# Patient Record
Sex: FEMALE | Race: BLACK OR AFRICAN AMERICAN | Employment: UNEMPLOYED | URBAN - METROPOLITAN AREA
[De-identification: names, ages, dates, MRNs, and addresses within clinical notes are randomized per-mention and may not be internally consistent; named-entity substitution may affect disease eponyms.]

---

## 2017-06-18 ENCOUNTER — HOSPITAL ENCOUNTER (EMERGENCY)
Age: 62
Discharge: HOME | End: 2017-06-18
Attending: INTERNAL MEDICINE
Payer: MEDICAID

## 2017-06-18 VITALS
DIASTOLIC BLOOD PRESSURE: 69 MMHG | HEART RATE: 76 BPM | TEMPERATURE: 98 F | BODY MASS INDEX: 31.71 KG/M2 | HEIGHT: 67 IN | RESPIRATION RATE: 18 BRPM | SYSTOLIC BLOOD PRESSURE: 111 MMHG | WEIGHT: 202 LBS | OXYGEN SATURATION: 100 %

## 2017-06-18 DIAGNOSIS — G44.319 ACUTE POST-TRAUMATIC HEADACHE, NOT INTRACTABLE: ICD-10-CM

## 2017-06-18 DIAGNOSIS — S13.4XXA WHIPLASH INJURIES, INITIAL ENCOUNTER: ICD-10-CM

## 2017-06-18 DIAGNOSIS — S09.90XA HEAD TRAUMA, INITIAL ENCOUNTER: Primary | ICD-10-CM

## 2017-06-18 LAB
APPEARANCE UR: CLEAR
BILIRUB UR QL: NEGATIVE
COLOR UR: YELLOW
GLUCOSE UR STRIP.AUTO-MCNC: NEGATIVE MG/DL
HGB UR QL STRIP: NEGATIVE
KETONES UR QL STRIP.AUTO: NEGATIVE MG/DL
LEUKOCYTE ESTERASE UR QL STRIP.AUTO: NEGATIVE
NITRITE UR QL STRIP.AUTO: NEGATIVE
PH UR STRIP: 5.5 [PH] (ref 5–8)
PROT UR STRIP-MCNC: NEGATIVE MG/DL
SP GR UR REFRACTOMETRY: 1.02 (ref 1–1.03)
UROBILINOGEN UR QL STRIP.AUTO: 1 EU/DL (ref 0.2–1)

## 2017-06-18 PROCEDURE — 81003 URINALYSIS AUTO W/O SCOPE: CPT | Performed by: INTERNAL MEDICINE

## 2017-06-18 PROCEDURE — 99282 EMERGENCY DEPT VISIT SF MDM: CPT

## 2017-06-18 RX ORDER — LORATADINE 10 MG/1
10 TABLET ORAL DAILY
COMMUNITY

## 2017-06-18 RX ORDER — HYDROCODONE BITARTRATE AND ACETAMINOPHEN 5; 325 MG/1; MG/1
1 TABLET ORAL
Qty: 20 TAB | Refills: 0 | Status: SHIPPED | OUTPATIENT
Start: 2017-06-18

## 2017-06-18 RX ORDER — HALOPERIDOL 5 MG/ML
50 INJECTION INTRAMUSCULAR ONCE
COMMUNITY

## 2017-06-18 RX ORDER — VALACYCLOVIR HYDROCHLORIDE 1 G/1
1000 TABLET, FILM COATED ORAL
COMMUNITY

## 2017-06-18 RX ORDER — HYDROCHLOROTHIAZIDE 25 MG/1
25 TABLET ORAL DAILY
COMMUNITY

## 2017-06-18 RX ORDER — TRAZODONE HYDROCHLORIDE 100 MG/1
100 TABLET ORAL
COMMUNITY

## 2017-06-18 NOTE — DISCHARGE INSTRUCTIONS
Whiplash: Care Instructions  Your Care Instructions  Whiplash occurs when your head is suddenly forced forward and then snapped backward, as might happen in a car accident or sports injury. This can cause pain and stiffness in your neck. Your head, chest, shoulders, and arms also may hurt. Most whiplash gets better with home care. Your doctor may advise you to take medicine to relieve pain or relax your muscles. He or she may suggest exercise and physical therapy to increase flexibility and relieve pain. You can try wearing a neck (cervical) collar to support your neck. For a while you probably will need to avoid lifting and other activities that can strain the neck. Follow-up care is a key part of your treatment and safety. Be sure to make and go to all appointments, and call your doctor if you are having problems. It's also a good idea to know your test results and keep a list of the medicines you take. How can you care for yourself at home? · Take pain medicines exactly as directed. ¨ If the doctor gave you a prescription medicine for pain, take it as prescribed. ¨ If you are not taking a prescription pain medicine, ask your doctor if you can take an over-the-counter medicine. ¨ Do not take two or more pain medicines at the same time unless the doctor told you to. Many pain medicines have acetaminophen, which is Tylenol. Too much acetaminophen (Tylenol) can be harmful. · You can try using a soft foam collar to support your neck for short periods of time. You can buy one at most drugstores. Do not wear the collar more than 2 or 3 days unless your doctor tells you to. · You can try using heat and ice to see if it helps. ¨ Try using a heating pad on a low or medium setting for 15 to 20 minutes every 2 to 3 hours. Try a warm shower in place of one session with the heating pad. You can also buy single-use heat wraps that last up to 8 hours.   ¨ You can also try an ice pack for 10 to 15 minutes every 2 to 3 hours. · Do not do anything that makes the pain worse. Take it easy for a couple of days. You can do your usual activities if they do not hurt your neck or put it at risk for more stress or injury. Avoid lifting, sports, or other activities that might strain your neck. · Try sleeping on a special neck pillow. Place it under your neck, not under your head. Placing a tightly rolled-up towel under your neck while you sleep will also work. If you use a neck pillow or rolled towel, do not use your regular pillow at the same time. · Once your neck pain is gone, do exercises to stretch your neck and back and make them stronger. Your doctor or physical therapist can tell you which exercises are best.  When should you call for help? Call 911 anytime you think you may need emergency care. For example, call if:  · You are unable to move an arm or a leg at all. Call your doctor now or seek immediate medical care if:  · You have new or worse symptoms in your arms, legs, chest, belly, or buttocks. Symptoms may include:  ¨ Numbness or tingling. ¨ Weakness. ¨ Pain. · You lose bladder or bowel control. Watch closely for changes in your health, and be sure to contact your doctor if:  · You are not getting better as expected. Where can you learn more? Go to http://nannette-sharyn.info/. Enter B279 in the search box to learn more about \"Whiplash: Care Instructions. \"  Current as of: May 23, 2016  Content Version: 11.2  © 0822-9438 [employer]. Care instructions adapted under license by [employer] (which disclaims liability or warranty for this information). If you have questions about a medical condition or this instruction, always ask your healthcare professional. [place] [address] any warranty or liability for your use of this information. Headache: Care Instructions  Your Care Instructions    Headaches have many possible causes.  Most headaches aren't a sign of a more serious problem, and they will get better on their own. Home treatment may help you feel better faster. The doctor has checked you carefully, but problems can develop later. If you notice any problems or new symptoms, get medical treatment right away. Follow-up care is a key part of your treatment and safety. Be sure to make and go to all appointments, and call your doctor if you are having problems. It's also a good idea to know your test results and keep a list of the medicines you take. How can you care for yourself at home? · Do not drive if you have taken a prescription pain medicine. · Rest in a quiet, dark room until your headache is gone. Close your eyes and try to relax or go to sleep. Don't watch TV or read. · Put a cold, moist cloth or cold pack on the painful area for 10 to 20 minutes at a time. Put a thin cloth between the cold pack and your skin. · Use a warm, moist towel or a heating pad set on low to relax tight shoulder and neck muscles. · Have someone gently massage your neck and shoulders. · Take pain medicines exactly as directed. ¨ If the doctor gave you a prescription medicine for pain, take it as prescribed. ¨ If you are not taking a prescription pain medicine, ask your doctor if you can take an over-the-counter medicine. · Be careful not to take pain medicine more often than the instructions allow, because you may get worse or more frequent headaches when the medicine wears off. · Do not ignore new symptoms that occur with a headache, such as a fever, weakness or numbness, vision changes, or confusion. These may be signs of a more serious problem. To prevent headaches  · Keep a headache diary so you can figure out what triggers your headaches. Avoiding triggers may help you prevent headaches. Record when each headache began, how long it lasted, and what the pain was like (throbbing, aching, stabbing, or dull).  Write down any other symptoms you had with the headache, such as nausea, flashing lights or dark spots, or sensitivity to bright light or loud noise. Note if the headache occurred near your period. List anything that might have triggered the headache, such as certain foods (chocolate, cheese, wine) or odors, smoke, bright light, stress, or lack of sleep. · Find healthy ways to deal with stress. Headaches are most common during or right after stressful times. Take time to relax before and after you do something that has caused a headache in the past.  · Try to keep your muscles relaxed by keeping good posture. Check your jaw, face, neck, and shoulder muscles for tension, and try relaxing them. When sitting at a desk, change positions often, and stretch for 30 seconds each hour. · Get plenty of sleep and exercise. · Eat regularly and well. Long periods without food can trigger a headache. · Treat yourself to a massage. Some people find that regular massages are very helpful in relieving tension. · Limit caffeine by not drinking too much coffee, tea, or soda. But don't quit caffeine suddenly, because that can also give you headaches. · Reduce eyestrain from computers by blinking frequently and looking away from the computer screen every so often. Make sure you have proper eyewear and that your monitor is set up properly, about an arm's length away. · Seek help if you have depression or anxiety. Your headaches may be linked to these conditions. Treatment can both prevent headaches and help with symptoms of anxiety or depression. When should you call for help? Call 911 anytime you think you may need emergency care. For example, call if:  · You have signs of a stroke. These may include:  ¨ Sudden numbness, paralysis, or weakness in your face, arm, or leg, especially on only one side of your body. ¨ Sudden vision changes. ¨ Sudden trouble speaking. ¨ Sudden confusion or trouble understanding simple statements.   ¨ Sudden problems with walking or balance. ¨ A sudden, severe headache that is different from past headaches. Call your doctor now or seek immediate medical care if:  · You have a new or worse headache. · Your headache gets much worse. Where can you learn more? Go to http://nannette-sharyn.info/. Enter M271 in the search box to learn more about \"Headache: Care Instructions. \"  Current as of: October 14, 2016  Content Version: 11.2  © 3348-9075 [employer]. Care instructions adapted under license by [employer] (which disclaims liability or warranty for this information). If you have questions about a medical condition or this instruction, always ask your healthcare professional. [place] [address] any warranty or liability for your use of this information. Learning About a Closed Head Injury  What is a closed head injury? A closed head injury happens when your head gets hit hard. The strong force of the blow causes your brain to shake in your skull. This movement can cause the brain to bruise, swell, or tear. Sometimes nerves or blood vessels also get damaged. This can cause bleeding in or around the brain. A concussion is a type of closed head injury. What are the symptoms? If you have a mild concussion, you may have a mild headache or feel \"not quite right. \" These symptoms are common. They usually go away over a few days to 4 weeks. But sometimes after a concussion, you feel like you can't function as well as before the injury. And you have new symptoms. This is called postconcussive syndrome. You may:  · Find it harder to solve problems, think, concentrate, or remember. · Have headaches. · Have changes in your sleep patterns, such as not being able to sleep or sleeping all the time. · Have changes in your personality. · Not be interested in your usual activities. · Feel angry or anxious without a clear reason. · Lose your sense of taste or smell.   · Be dizzy, lightheaded, or unsteady. It may be hard to stand or walk. How is a closed head injury treated? Any person who may have a concussion needs to see a doctor. Some people have to stay in the hospital to be watched. Others can go home safely. If you go home, follow your doctor's instructions. He or she will tell you if you need someone to watch you closely for the next 24 hours or longer. Rest is the best treatment. Get plenty of sleep at night. And try to rest during the day. · Avoid activities that are physically or mentally demanding. These include housework, exercise, and schoolwork. And don't play video games, send text messages, or use the computer. You may need to change your school or work schedule to be able to avoid these activities. · Ask your doctor when it's okay to drive, ride a bike, or operate machinery. · Take an over-the-counter pain medicine, such as acetaminophen (Tylenol), ibuprofen (Advil, Motrin), or naproxen (Aleve). Be safe with medicines. Read and follow all instructions on the label. · Check with your doctor before you use any other medicines for pain. · Do not drink alcohol or use illegal drugs. They can slow recovery. They can also increase your risk of getting a second head injury. Follow-up care is a key part of your treatment and safety. Be sure to make and go to all appointments, and call your doctor if you are having problems. It's also a good idea to know your test results and keep a list of the medicines you take. Where can you learn more? Go to http://nannette-sharyn.info/. Enter E235 in the search box to learn more about \"Learning About a Closed Head Injury. \"  Current as of: October 14, 2016  Content Version: 11.2  © 3196-5301 [employer]. Care instructions adapted under license by [employer] (which disclaims liability or warranty for this information).  If you have questions about a medical condition or this instruction, always ask your healthcare professional. [place] [address] any warranty or liability for your use of this information.
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
## 2017-06-18 NOTE — ED TRIAGE NOTES
Amb into ed w/ reports h/a onset yesterday -worse over nite - no pmh same. Pt describes h/a as heavy and achy in nature. Does not feel right per pt. Denies any weakness. Good appetite.

## 2017-11-15 ENCOUNTER — HOSPITAL ENCOUNTER (OUTPATIENT)
Dept: PHYSICAL THERAPY | Age: 62
Discharge: HOME | End: 2017-11-15
Payer: MEDICAID

## 2017-11-15 PROCEDURE — 97110 THERAPEUTIC EXERCISES: CPT

## 2017-11-15 PROCEDURE — 97530 THERAPEUTIC ACTIVITIES: CPT

## 2017-11-15 PROCEDURE — 97162 PT EVAL MOD COMPLEX 30 MIN: CPT

## 2017-11-15 NOTE — PROGRESS NOTES
[facility] in [address] [place], [facility] [address]  Phone: [phone]      Fax:  [phone] of Care/ Statement of Necessity for Physical Therapy Services       Patient name: Radha Velasquez Start of Care: 11/15/2017   Referral source: Nettie Orantes MD : 1955    Medical Diagnosis: Acute pain of right knee [M25.561]  Right ankle pain [M25.571]   Onset Date:10/31/17 right knee, > 5 years right ankle    Treatment Diagnosis: right knee and ankle pain   Prior Hospitalization: see medical history Provider#: [id number]   Medications: Verified on Patient summary List    Comorbidities: arthritis, high BP, chronic right ankle pain after sprain in    Prior Level of Function: chronic right ankle pain      The Plan of Care and following information is based on the information from the initial evaluation. Assessment/ key information: 57 YOF s/p recent onset of right knee pain and 5 year history of fluctuating right ankle pain with recent exacerbation. Patient reports pain levels ranging from 0-9/10 nicole with prolonged ambulation. Objective findings include limited knee ROM (), limited right ankle DF (5 deg), antalgic gait pattern, bilateral ankle varus, deficit in proximal LE strength and overall limited function. Patient is a good candidate for skilled PT. Evaluation Complexity History HIGH Complexity :3+ comorbidities / personal factors will impact the outcome/ POC ; Examination MEDIUM Complexity : 3 Standardized tests and measures addressing body structure, function, activity limitation and / or participation in recreation  ;Presentation MEDIUM Complexity : Evolving with changing characteristics  ; Clinical Decision Making MEDIUM Complexity : FOTO score of 26-74  Overall Complexity Rating: MEDIUM  Problem List: pain affecting function, decrease ROM, decrease strength, edema affecting function, impaired gait/ balance, decrease ADL/ functional abilitiies, decrease activity tolerance, decrease flexibility/ joint mobility and decrease transfer abilities   Treatment Plan may include any combination of the following: Therapeutic exercise, Therapeutic activities, Neuromuscular re-education, Physical agent/modality, Manual therapy and Patient education  Patient / Family readiness to learn indicated by: asking questions and trying to perform skills  Persons(s) to be included in education: patient (P)  Barriers to Learning/Limitations: None  Patient Goal (s): I want to be able to walk without a limp  Patient Self Reported Health Status: good  Rehabilitation Potential: good    Short Term Goals: To be accomplished in 3 weeks:   1. Improved knee ROM to >or= 5-130 deg for return to PLOF  Status at [facility]: Right knee Flex 120, Ext -20    2. Improved right ankle DF to >or= 20 deg to allow return to normal gait pattern on flat surface and stairs  Status at [facility]: DF right 5 deg with pain at end range, limited ambulation tolerance , difficulty with ambulation on stairs      Long Term Goals: To be accomplished in 6 weeks:   1. Improved FOTO score to >or= 51/100 as evidence of improved function and return to community ambulation  Status at [facility]: FOTO 33/100    2. Ability to perform sit to stand transfers without use of hands as evidence of improved LE strength  Status at [facility]: required to use hands for transfers    3. Ability to return to community ambulation with minimal or no limp  Status at [facility]: antalgic gait pattern    4. Reduction of pain to <or= 3/10 with ADL to allow retutn to PLOF  STatus at [facility]: pain ranging from 0-9/10 nicole with ambulation    Frequency / Duration: Patient to be seen 2 times per week for 6 weeks.     Patient/ Caregiver education and instruction: Diagnosis, prognosis, exercises   [x]  Plan of care has been reviewed with KIMBERLEE Loco, PT 11/15/2017 9:13 AM  _____________________________________________________________________  I certify that the above Therapy Services are being furnished while the patient is under my care. I agree with the treatment plan and certify that this therapy is necessary. Physician's Signature:____________________  Date:__________Time:______    Please sign and return to   [facility] in [address] [place] [address] [place]  Phone: [phone]      Fax:  [phone]

## 2017-11-15 NOTE — PROGRESS NOTES
PT DAILY TREATMENT NOTE/KNEE EVAL 3-16    Patient Name: Flores Sears  Date:11/15/2017  : 1955  [x]  Patient  Verified  Payor: Michel Grace / [place] / Product Type: Managed Care Medicaid /    In time:910  Out time:1010  Total Treatment Time (min): 60  Total Timed Codes (min): 60  1:1 Treatment Time ( only): n/a   Visit #: 1 of 12    Treatment Area: Acute pain of right knee [M25.561]  Right ankle pain [M25.571]    SUBJECTIVE  Pain Level (0-10 scale): right knee 5-6, right ankle 4  []constant [x]intermittent []improving []worsening []no change since onset    Any medication changes, allergies to medications, adverse drug reactions, diagnosis change, or new procedure performed?: [x] No    [] Yes (see summary sheet for update)  Subjective functional status/changes: Reports  ankle pain for over 5 years. Reports twisting her right ankle when running wearing high heels . Unsure if she sustained any tears. Since then off/on pain with activity and at rest. Nocturnal pain. Onset of right knee pain  due to gout as per MD. No injury. Occasional pain free episodes. Stiffness in AM. Pain 0-9/10 nicole with ambulation. Not working outside of home. 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 Difficulty with prolonged ambulation,  Stair climbing, sit to stand transfer (I need to push myself up).   PLOF: full function with fluctuating levels of ankle pain  Limitations to PLOF: prolonged standing/ambulation  Mechanism of Injury: as above  Current symptoms/Complaints: stiffness /pain right knee, right ankle pain  Previous Treatment/Compliance: PT for right ankle at our clinic   PMHx/Surgical Hx: n/a  Work Hx: homemaker  Living Situation: apartment, 4th floor with elevator  Pt Goals: I want to be able to walk without a limp  Barriers: [x]pain []financial []time []transportation []other  Motivation: good  Substance use: []Alcohol []Tobacco []other:   FABQ Score: []low []elevate  Cognition: A & O x 3    Other:    OBJECTIVE/EXAMINATION  Domestic Life: WNL  Activity/Recreational Limitations: fishing, not lately due to weather and knee pain  Mobility: WNL  Self Care:  WNL        Modality rationale: Pain control   Min Type Additional Details    [] Estim:  []Unatt       []IFC  []Premod                        []Other:  []w/ice   []w/heat  Position:  Location:    [] Estim: []Att    []TENS instruct  []NMES                    []Other:  []w/US   []w/ice   []w/heat  Position:  Location:    []  Traction: [] Cervical       []Lumbar                       [] Prone          []Supine                       []Intermittent   []Continuous Lbs:  [] before manual  [] after manual    []  Ultrasound: []Continuous   [] Pulsed                           []1MHz   []3MHz Location:  W/cm2:    []  Iontophoresis with dexamethasone         Location: [] Take home patch   [] In clinic    []  Ice     []  heat  []  Ice massage  []  Laser   []  Anodyne Position:  Location:    []  Laser with stim  []  Other: Position:  Location:    []  Vasopneumatic Device Pressure:       [] lo [] med [] hi   Temperature: [] lo [] med [] hi   [] Skin assessment post-treatment:  []intact []redness- no adverse reaction    []redness  adverse reaction:     20 min []Eval                  []Re-Eval       25 min Therapeutic Exercise:  [x] See flow sheet :ROM/strength   Rationale: increase ROM and increase strength to improve the patients ability to return to full function    15 min Therapeutic Activity:  [x]  See flow sheet :instruction in HEP and POC   Rationale: increase ROM and increase strength  to improve the patients ability to return to PLOF               With   [] TE   [] TA   [] neuro   [] other: Patient Education: [x] Review HEP    [] Progressed/Changed HEP based on:   [] positioning   [] body mechanics   [] transfers   [] heat/ice application    [] other:      Other Objective/Functional Measures: as per evaluation    Physical Therapy Evaluation - Knee    Posture: [] Varus    [] Valgus    [] Recurvatum        [] Tibial Torsion    [] Foot Supination    [] Foot Pronation    Describe:mild guarding in Flex right knee, normal alignment  Bilateral ankle varus right >left, increased hip ER in standing on right    Gait:  [] Normal    [] Abnormal    [x] Antalgic    [] NWB    Device:none, occasional use of SC    Describe:    ROM / Strength  [] Unable to assess                  AROM                      PROM                   Strength (1-5)    Left Right Left Right Left Right   Hip Flexion WNL WNL   4- 4-    Extension     3+ 3+    Abduction     4 4-    Adduction     3 3   Knee Flexion 130 120p  p  4+    Extension 0 -20p  p  4   Ankle Plantarflexion  60p  p  4    Dorsiflexion  5p  p  4   Ankle Eversion/Inversion limited with reported pain  Marked hypomobility right calcaneus with manual mobs    Flexibility: [] Unable to assess at this time  Hamstrings:    (L) Tightness= [] WNL   [] Min   [x] Mod   [] Severe    (R) Tightness= [] WNL   [] Min   [x] Mod   [] Severe  Quadriceps:    (L) Tightness= [] WNL   [x] Min   [] Mod   [] Severe    (R) Tightness= [] WNL   [x] Min   [] Mod   [] Severe  Gastroc:      (L) Tightness= [] WNL   [x] Min   [] Mod   [] Severe    (R) Tightness= [] WNL   [] Min   [x] Mod   [] Severe  Other:    Palpation:   Neg/Pos  Neg/Pos  Neg/Pos   Joint Line pos Quad tendon pos Patellar ligament pos   Patella pos Fibular head  Pes Anserinus    Tibial tubercle  Hamstring tendons pos Infrapatellar fat pad      Optional Tests:  Patellar Positioning (Static)   []L []R Normal []L []R Lateral   []L []R Orrie Primas      []L []R Medial   []L []R [place]    Patellar Tracking   []L []R Glide (Lat)   []L []R Tilt (Lat)     []L []R Glide (Med)  []L []R Tilt (Med)      []L []R Tile (Inf)     Patellar Mobility   []L []R Hypermobile []L []R Hypomobile         Girth Measurements:     Cm at  Cm above joint line   Cm at   Cm below joint line  Cm at joint line   Left 44.5    44   Right  42    41     Lachmans  [] Neg    [] Pos Posterior Drawer [] Neg    [] Pos  Pivot Shift  [] Neg    [] Pos Posterior Sag  [] Neg    [] Pos  ABDULLAHI   [] Neg    [] Pos Houston's Test [] Neg    [] Pos  ALRI   [] Neg    [] Pos Squat   [] Neg    [x] Pos, limited ROM and pain  Valgus@ 0 Degrees [x] Neg    [] Pos Elvira-Sudhir [] Neg    [] Pos  Valgus@ 30 Degrees [x] Neg    [] Pos Patellar Apprehension [] Neg    [] Pos  Varus@ 0 Degrees [x] Neg    [] Pos Ba's Compression [] Neg    [] Pos  Varus@ 30 Degrees [x] Neg    [] Pos Ely's Test  [] Neg    [] Pos  Apley's Compression [] Neg    [] Pos Cornelia's Test  [] Neg    [] Pos  Apley's Distraction [] Neg    [] Pos Stroke Test  [] Neg    [] Pos   Anterior Drawer [] Neg    [] Pos Fluctuation Test [] Neg    [] Pos  Other:                  [] Neg    [] Pos                 Other tests/comments:pain with ROM        Pain Level (0-10 scale) post treatment: 6/10 knee, 4/10 ankle    ASSESSMENT/Changes in Function: reduction in antalgic gait pattern    Patient will continue to benefit from skilled PT services to address ROM deficits, address strength deficits, analyze and address soft tissue restrictions and analyze and cue movement patterns to attain remaining goals.      [x]  See Plan of Care  []  See progress note/recertification  []  See Discharge Summary         Progress towards goals / Updated goals:  Understanding of basic HEP    PLAN  []  Upgrade activities as tolerated     [x]  Continue plan of care  []  Update interventions per flow sheet       []  Discharge due to:_  []  Other:_      Yari Howard, PT 11/15/2017  9:14 AM

## 2017-11-21 ENCOUNTER — APPOINTMENT (OUTPATIENT)
Dept: PHYSICAL THERAPY | Age: 62
End: 2017-11-21
Payer: MEDICAID

## 2017-11-28 ENCOUNTER — APPOINTMENT (OUTPATIENT)
Dept: PHYSICAL THERAPY | Age: 62
End: 2017-11-28
Payer: MEDICAID

## 2017-11-30 ENCOUNTER — APPOINTMENT (OUTPATIENT)
Dept: PHYSICAL THERAPY | Age: 62
End: 2017-11-30
Payer: MEDICAID

## 2017-12-27 ENCOUNTER — APPOINTMENT (OUTPATIENT)
Dept: PHYSICAL THERAPY | Age: 62
End: 2017-12-27

## 2018-01-04 ENCOUNTER — APPOINTMENT (OUTPATIENT)
Dept: PHYSICAL THERAPY | Age: 63
End: 2018-01-04

## 2018-01-12 ENCOUNTER — HOSPITAL ENCOUNTER (OUTPATIENT)
Dept: PHYSICAL THERAPY | Age: 63
Discharge: HOME | End: 2018-01-12
Payer: MEDICAID

## 2018-01-12 PROCEDURE — 97530 THERAPEUTIC ACTIVITIES: CPT

## 2018-01-12 PROCEDURE — 97110 THERAPEUTIC EXERCISES: CPT

## 2018-01-16 ENCOUNTER — HOSPITAL ENCOUNTER (OUTPATIENT)
Dept: PHYSICAL THERAPY | Age: 63
Discharge: HOME | End: 2018-01-16
Payer: MEDICAID

## 2018-01-16 PROCEDURE — 97110 THERAPEUTIC EXERCISES: CPT

## 2018-01-16 PROCEDURE — 97140 MANUAL THERAPY 1/> REGIONS: CPT

## 2018-01-16 NOTE — PROGRESS NOTES
[facility] in [address] [place], [address] [place] [address]  Phone: [phone]      Fax:  [phone]    Physical Therapy Progress Note  Patient name: Lulu Euceda Start of Care: 11/15/17   Referral source: Yady Garcia MD : 1955   Medical/Treatment Diagnosis: Acute pain of right knee [M25.561]  Right ankle pain [M25.571] Onset Date:10/31/17 right knee, > 5 years right ankle     Prior Hospitalization: see medical history Provider#: [id number]   Medications: Verified on Patient Summary List    Comorbidities: arthritis, high BP, chronic right ankle pain after sprain in   Prior Level of Function:chronic right ankle pain    Visits from Start of Care: 3    Missed Visits: [facility] be accomplished in 3 weeks:                           1. Improved knee ROM to >or= 5-130 deg for return to PLOF  Status at Eval: Right knee Flex 120, Ext -20  Current status: Right knee Flex 115, Ext -5, progressing for Ext      2. Improved right ankle DF to >or= 20 deg to allow return to normal gait pattern on flat surface and stairs  Status at Eval: DF right 5 deg with pain at end range, limited ambulation tolerance , difficulty with ambulation on stairs  Current: DF 6* still progressing           Long Term Goals: To be accomplished in 6 weeks:                           1. Improved FOTO score to >or= 51/100 as evidence of improved function and return to community ambulation  Status at Eval: FOTO 33/100  Current: Will assess NV      2. Ability to perform sit to stand transfers without use of hands as evidence of improved LE strength  Status at Eval: required to use hands for transfers  Current: Continues to require both UE progressing       3. Ability to return to community ambulation with minimal or no limp  Status at Eval: antalgic gait pattern  Current: Continued antalgic gait, however decreased with use of cane progressing       4.  Reduction of pain to <or= 3/10 with ADL to allow retutn to PLOF  STatus at Eval: pain ranging from 0-9/10 nicole with ambulation   Current: pain ranging 8/10 with walking increasing pain progressing     Key Functional Changes: Pt presented to therapy with c/c right knee with onset in October and 5 year history of fluctuating right ankle pain. Pt has only attended 3 sessions due to gout flare up. Pt reports continued pain up to 8/10 at times with ambulation and up/down stairs still difficult for patient due to pain. Pt would benefit from continued PT services to address decreased strength, quad/HS flexibility, and ROM to allow pt to return to prior level of function.       Updated Goals: to be achieved in 4 weeks:   See above    ASSESSMENT/RECOMMENDATIONS:  [x]Continue therapy per initial plan/protocol at a frequency of  1-2 x per week for 4 weeks  []Continue therapy with the following recommended changes:_____________________      _____________________________________________________________________  []Discontinue therapy progressing towards or have reached established goals  []Discontinue therapy due to lack of appreciable progress towards goals  []Discontinue therapy due to lack of attendance or compliance  []Await Physician's recommendations/decisions regarding therapy  []Other:________________________________________________________________    Thank you for this referral.   Merry Coronado [facility] 1/16/2018 2:13 PM    NOTE TO PHYSICIAN:  PLEASE COMPLETE THE ORDERS BELOW AND   FAX TO [facility]: [phone]  If you are unable to process this request in 24 hours please contact our office: [phone]      ____ I have read the above report and request that my patient continue therapy with the following changes/special instructions:  ____ I have read the above report and request that my patient be discharged from therapy    Physician's Signature:_____________________ Date:___________Time:__________

## 2018-01-16 NOTE — PROGRESS NOTES
PT DAILY TREATMENT NOTE     Patient Name: Rosa Camilo  Date:2018  : 1955  [x]  Patient  Verified  Payor: [facility] MEDICAID / [place]: [place] [employer] [employer] / Product Type: Managed Care Medicaid /    In time:1:40  Out time:2:30  Total Treatment Time (min): 50  Visit #: 3 of 12    Treatment Area: Acute pain of right knee [M25.561]  Right ankle pain [M25.571]    SUBJECTIVE  Pain Level (0-10 scale): 6  Any medication changes, allergies to medications, adverse drug reactions, diagnosis change, or new procedure performed?: [x] No    [] Yes (see summary sheet for update)  Subjective functional status/changes:   [] No changes reported  \"My leg still really hurts when I walk and go up/down stairs. \"    OBJECTIVE    Modality rationale: decrease inflammation and decrease pain to improve the patients ability to perform daily activities with decreased pain and symptom levels   Min Type Additional Details    [] Estim:  []Unatt       []IFC  []Premod                        []Other:  []w/ice   []w/heat  Position:  Location:    [] Estim: []Att    []TENS instruct  []NMES                    []Other:  []w/US   []w/ice   []w/heat  Position:  Location:    []  Traction: [] Cervical       []Lumbar                       [] Prone          []Supine                       []Intermittent   []Continuous Lbs:  [] before manual  [] after manual    []  Ultrasound: []Continuous   [] Pulsed                           []1MHz   []3MHz W/cm2:  Location:    []  Iontophoresis with dexamethasone         Location: [] Take home patch   [] In clinic   10 [x]  Ice     []  heat  []  Ice massage  []  Laser   []  Anodyne Position: supine  Location: right knee    []  Laser with stim  []  Other:  Position:  Location:    []  Vasopneumatic Device Pressure:       [] lo [] med [] hi   Temperature: [] lo [] med [] hi   [x] Skin assessment post-treatment:  [x]intact []redness- no adverse reaction    []redness  adverse reaction:       32 min Therapeutic Exercise:  [x] See flow sheet :   Rationale: increase ROM, increase strength, improve coordination and increase proprioception to improve the patients ability to perform daily activities with decreased pain and symptom levels    8 min Manual Therapy:  STM to lateral and medial gastroc, gentle tibiofemoral mobs into extension, STM to distal quad   Rationale: decrease pain, increase ROM and increase tissue extensibility to perform daily activities with decreased pain and symptom levels          With   [] TE   [] TA   [] neuro   [] other: Patient Education: [x] Review HEP    [] Progressed/Changed HEP based on:   [] positioning   [] body mechanics   [] transfers   [] heat/ice application    [] other:      Other Objective/Functional Measures:   See updated goals below     Pain Level (0-10 scale) post treatment: 6    ASSESSMENT/Changes in Function: Pt presented to therapy with c/c right knee with onset in October and 5 year history of fluctuating right ankle pain. Pt has only attended 3 sessions due to gout flare up. Pt reports continued pain up to 8/10 at times with ambulation and up/down stairs still difficult for patient due to pain. Pt would benefit from continued PT services to address decreased strength, quad/HS flexibility, and ROM to allow pt to return to prior level of function. Patient will continue to benefit from skilled PT services to modify and progress therapeutic interventions, address functional mobility deficits, address ROM deficits, address strength deficits, analyze and address soft tissue restrictions, analyze and modify body mechanics/ergonomics and instruct in home and community integration to attain remaining goals. []  See Plan of Care  []  See progress note/recertification  []  See Discharge Summary         Progress towards goals / Updated goals:  Short Term Goals: To be accomplished in 3 weeks:                           1.  Improved knee ROM to >or= 5-130 deg for return to PLOF  Status at Eval: Right knee Flex 120, Ext -20  Current status: Right knee Flex 115, Ext -5, progressing for Ext      2. Improved right ankle DF to >or= 20 deg to allow return to normal gait pattern on flat surface and stairs  Status at Eval: DF right 5 deg with pain at end range, limited ambulation tolerance , difficulty with ambulation on stairs  Current: DF 6* still progressing           Long Term Goals: To be accomplished in 6 weeks:                           1. Improved FOTO score to >or= 51/100 as evidence of improved function and return to community ambulation  Status at Eval: FOTO 33/100  Current: Will assess NV      2. Ability to perform sit to stand transfers without use of hands as evidence of improved LE strength  Status at Eval: required to use hands for transfers  Current: Continues to require both UE progressing       3. Ability to return to community ambulation with minimal or no limp  Status at Eval: antalgic gait pattern  Current: Continued antalgic gait, however decreased with use of cane progressing       4.  Reduction of pain to <or= 3/10 with ADL to allow retutn to PLOF  STatus at Eval: pain ranging from 0-9/10 nicole with ambulation   Current: pain ranging 8/10 with walking increasing pain progressing     PLAN  [x]  Upgrade activities as tolerated     [x]  Continue plan of care  []  Update interventions per flow sheet       []  Discharge due to:_  []  Other:_      Valerie Donato 1/16/2018  1:49 PM    Future Appointments  Date Time Provider Wilder Padilla   1/18/2018 8:00 AM Valerie Dnoato [facility]   1/23/2018 11:00 AM Nadine Ceballos PT [facility]

## 2018-01-18 ENCOUNTER — APPOINTMENT (OUTPATIENT)
Dept: PHYSICAL THERAPY | Age: 63
End: 2018-01-18
Payer: MEDICAID

## 2018-01-19 ENCOUNTER — HOSPITAL ENCOUNTER (OUTPATIENT)
Dept: PHYSICAL THERAPY | Age: 63
Discharge: HOME | End: 2018-01-19
Payer: MEDICAID

## 2018-01-19 PROCEDURE — 97110 THERAPEUTIC EXERCISES: CPT

## 2018-01-19 PROCEDURE — 97140 MANUAL THERAPY 1/> REGIONS: CPT

## 2018-01-19 NOTE — PROGRESS NOTES
PT DAILY TREATMENT NOTE - [facility]     Patient Name: Christa Chong  Date:2018  : 1955  [x]  Patient  Verified  Payor: [facility] MEDICAID / [place]: [place] / Product Type: Managed Care Medicaid /    In time:11:30  Out time:12:19  Total Treatment Time (min): 49  Visit #: 4 of 12    Treatment Area: Acute pain of right knee [M25.561]  Right ankle pain [M25.571]    SUBJECTIVE  Pain Level (0-10 scale): 5  Any medication changes, allergies to medications, adverse drug reactions, diagnosis change, or new procedure performed?: [x] No    [] Yes (see summary sheet for update)  Subjective functional status/changes:   [] No changes reported  \"when you worked on my leg last time it really helped. \"    OBJECTIVE      37 min Therapeutic Exercise:  [x] See flow sheet :   Rationale: increase ROM, increase strength and improve coordination to improve the patients ability to perform daily activities with decreased pain and symptom levels    12 min Manual Therapy:  STM to lateral and medial gastroc, gentle tibiofemoral mobs into extension, STM to distal quad   Rationale: decrease pain, increase ROM and increase tissue extensibility to perform daily activities with decreased pain and symptom levels              With   [] TE   [] TA   [] neuro   [] other: Patient Education: [x] Review HEP    [] Progressed/Changed HEP based on:   [] positioning   [] body mechanics   [] transfers   [] heat/ice application    [] other:      Other Objective/Functional Measures:   Increased tone in distal quad, gastroc  Able to maintain SL balance for 20 seconds however increased lateral lean noted     Pain Level (0-10 scale) post treatment: 5    ASSESSMENT/Changes in Function: Pt continues to report pain in anterior right knee and ankle. Decreased sup/inf patella mobility noted on right possibly due to increased tone in quads. Continued antalgic gait due to pain without AD.  Fatigue with standing 3 way hip and clams due to glut weakness. Patient will continue to benefit from skilled PT services to modify and progress therapeutic interventions, address functional mobility deficits, address ROM deficits, address strength deficits, analyze and address soft tissue restrictions, analyze and modify body mechanics/ergonomics and instruct in home and community integration to attain remaining goals. []  See Plan of Care  []  See progress note/recertification  []  See Discharge Summary         Progress towards goals / Updated goals:  Short Term Goals: To be accomplished in 3 weeks:                           1. Improved knee ROM to >or= 5-130 deg for return to PLOF  Status at Eval: Right knee Flex 120, Ext -20  Last PN: Right knee Flex 115, Ext -5  Current:       2. Improved right ankle DF to >or= 20 deg to allow return to normal gait pattern on flat surface and stairs  Status at Eval: DF right 5 deg with pain at end range, limited ambulation tolerance , difficulty with ambulation on stairs  Last PN: DF 6* still   Current:           Long Term Goals: To be accomplished in 6 weeks:                           1. Improved FOTO score to >or= 51/100 as evidence of improved function and return to community ambulation  Status at Eval: FOTO 33/100  Current: Will assess at visit 5      2. Ability to perform sit to stand transfers without use of hands as evidence of improved LE strength  Status at Eval: required to use hands for transfers  Last PN: Continues to require both UE  Current:        3. Ability to return to community ambulation with minimal or no limp  Status at Eval: antalgic gait pattern  Last PN: Continued antalgic gait, however decreased with use of cane   Current: Still using SPC in community, antalgic on right LE without AD      4.  Reduction of pain to <or= 3/10 with ADL to allow retutn to PLOF  STatus at Eval: pain ranging from 0-9/10 nicole with ambulation   Last PN: pain ranging 8/10 with walking increasing pain   Current:     PLAN  [x] Upgrade activities as tolerated     [x]  Continue plan of care  []  Update interventions per flow sheet       []  Discharge due to:_  []  Other:_      Abbie Donato 1/19/2018  11:37 AM    Future Appointments  Date Time Provider Wilder Padilla   1/23/2018 11:00 AM Nadine sarah, PT [facility]

## 2018-01-23 ENCOUNTER — HOSPITAL ENCOUNTER (OUTPATIENT)
Dept: PHYSICAL THERAPY | Age: 63
Discharge: HOME | End: 2018-01-23
Payer: MEDICAID

## 2018-01-23 PROCEDURE — 97140 MANUAL THERAPY 1/> REGIONS: CPT

## 2018-01-23 PROCEDURE — 97110 THERAPEUTIC EXERCISES: CPT

## 2018-01-23 NOTE — PROGRESS NOTES
PT DAILY TREATMENT NOTE - [facility]     Patient Name: Jae Trammell  Date:2018  : 1955  [x]  Patient  Verified  Payor: [facility] MEDICAID / Plan: [place] [employer] [facility] / Product Type: Managed Care Medicaid /    In time:11  Out time:12:19  Total Treatment Time (min): 49  Visit #: 5 of 12    Treatment Area: Acute pain of right knee [M25.561]  Right ankle pain [M25.571]    SUBJECTIVE  Pain Level (0-10 scale): 4 knee, 5 ankle  Any medication changes, allergies to medications, adverse drug reactions, diagnosis change, or new procedure performed?: [x] No    [] Yes (see summary sheet for update)  Subjective functional status/changes:   [] No changes reported  \"I am feeling better but my knee still hurts when I straighten it out. \"    OBJECTIVE      30 min Therapeutic Exercise:  [x] See flow sheet :   Rationale: increase ROM, increase strength and improve coordination to improve the patients ability to perform daily activities with decreased pain and symptom levels    15 min Manual Therapy:  Functional massage to right quad in seated position, functional massage to achilles tendon in prone with knee flexed and extended, application of Kinesiotape over achilles tendon with 50% tension over calcaneal insertion for pain control   Rationale: decrease pain, increase ROM and increase tissue extensibility to perform daily activities with decreased pain and symptom levels              With   [] TE   [] TA   [] neuro   [] other: Patient Education: [x] Review HEP    [] Progressed/Changed HEP based on:   [] positioning   [] body mechanics   [] transfers   [] heat/ice application    [] other:      Other Objective/Functional Measures:   Not using SC unless having increased pain, limited community ambulation  Deficit in functional right TKE during ambulation  Right knee ROM , posterior knee tightness  Right ankle DF 15  Occasional sharp pain right posterior heel  TTP consistent with achilles tendonitis    Pain Level (0-10 scale) post treatment: 5    ASSESSMENT/Changes in Function: Pt continues to report pain in anterior right knee and ankle TTP to quad tendon and achilles insertion    Patient will continue to benefit from skilled PT services to modify and progress therapeutic interventions, address functional mobility deficits, address ROM deficits, address strength deficits, analyze and address soft tissue restrictions, analyze and modify body mechanics/ergonomics and instruct in home and community integration to attain remaining goals. [x]  See Plan of Care  []  See progress note/recertification  []  See Discharge Summary         Progress towards goals / Updated goals:  Short Term Goals: To be accomplished in 3 weeks:                           1. Improved knee ROM to >or= 5-130 deg for return to PLOF  Status at Eval: Right knee Flex 120, Ext -20  Last PN: Right knee Flex 115, Ext -5  Current: Active , progressing      2. Improved right ankle DF to >or= 20 deg to allow return to normal gait pattern on flat surface and stairs  Status at Eval: DF right 5 deg with pain at end range, limited ambulation tolerance , difficulty with ambulation on stairs  Last PN: DF 6* still   Current: Right ankle DF 15 deg (NWB), progressing          Long Term Goals: To be accomplished in 6 weeks:                           1. Improved FOTO score to >or= 51/100 as evidence of improved function and return to community ambulation  Status at Eval: FOTO 33/100  Current: FOTO knee 36/100, ankle 37/100, minimal improvement, progressing      2. Ability to perform sit to stand transfers without use of hands as evidence of improved LE strength  Status at Eval: required to use hands for transfers  Last PN: Continues to require both UE  Current:  NT      3.  Ability to return to community ambulation with minimal or no limp  Status at Eval: antalgic gait pattern  Last PN: Continued antalgic gait, however decreased with use of cane   Current: ambulation without SC, residual knee guarding and incomplete TKE during ambulation, progressing      4. Reduction of pain to <or= 3/10 with ADL to allow retutn to PLOF  STatus at Eval: pain ranging from 0-9/10 nicole with ambulation   Last PN: pain ranging 8/10 with walking increasing pain   Current: 4/10 right knee, 5/10 right ankle/heel, progressing    PLAN  [x]  Upgrade activities as tolerated     [x]  Continue plan of care  []  Update interventions per flow sheet       []  Discharge due to:_  []  Other:_      Nadine Cabrera, PT 1/23/2018  11:37 AM    No future appointments.

## 2018-01-30 ENCOUNTER — HOSPITAL ENCOUNTER (OUTPATIENT)
Dept: PHYSICAL THERAPY | Age: 63
End: 2018-01-30
Payer: MEDICAID

## 2018-02-02 ENCOUNTER — HOSPITAL ENCOUNTER (OUTPATIENT)
Dept: PHYSICAL THERAPY | Age: 63
Discharge: HOME | End: 2018-02-02
Payer: MEDICAID

## 2018-02-02 PROCEDURE — 97140 MANUAL THERAPY 1/> REGIONS: CPT

## 2018-02-02 PROCEDURE — 97110 THERAPEUTIC EXERCISES: CPT

## 2018-02-02 NOTE — PROGRESS NOTES
PT DAILY TREATMENT NOTE - [facility]     Patient Name: Yaakov Herrera  Date:2018  : 1955  [x]  Patient  Verified  Payor: [facility] MEDICAID / Plan: [place] [employer] [facility] / Product Type: Managed Care Medicaid /    In time:910  Out time:10  Total Treatment Time (min): 50  Visit #: 5 of 12    Treatment Area: Acute pain of right knee [M25.561]  Right ankle pain [M25.571]    SUBJECTIVE  Pain Level (0-10 scale): 7 knee, 0 ankle  Any medication changes, allergies to medications, adverse drug reactions, diagnosis change, or new procedure performed?: [x] No    [] Yes (see summary sheet for update)  Subjective functional status/changes:   [] No changes reported  \"My knee still hurts\"    OBJECTIVE      40 min Therapeutic Exercise:  [x] See flow sheet :ROM, flexibility, strength   Rationale: increase ROM, increase strength and improve coordination to improve the patients ability to perform daily activities with decreased pain and symptom levels    10 min Manual Therapy:  Functional massage to right quad in seated position, application of Kinesiotape with tension over right knee/quad   Rationale: decrease pain, increase ROM and increase tissue extensibility to perform daily activities with decreased pain and symptom levels              With   [] TE   [] TA   [] neuro   [] other: Patient Education: [x] Review HEP    [] Progressed/Changed HEP based on:   [] positioning   [] body mechanics   [] transfers   [] heat/ice application    [] other:      Other Objective/Functional Measures:   TTP right anterior and lateral knee  No antalgia    Pain Level (0-10 scale) post treatment: 6    ASSESSMENT/Changes in Function: good tolerance to activities    Patient will continue to benefit from skilled PT services to modify and progress therapeutic interventions, address functional mobility deficits, address ROM deficits, address strength deficits, analyze and address soft tissue restrictions, analyze and modify body mechanics/ergonomics and instruct in home and community integration to attain remaining goals. [x]  See Plan of Care  []  See progress note/recertification  []  See Discharge Summary         Progress towards goals / Updated goals:  Short Term Goals: To be accomplished in 3 weeks:                           1. Improved knee ROM to >or= 5-130 deg for return to PLOF  Status at Eval: Right knee Flex 120, Ext -20  Last PN: Right knee Flex 115, Ext -5  Current: Active , progressing      2. Improved right ankle DF to >or= 20 deg to allow return to normal gait pattern on flat surface and stairs  Status at Eval: DF right 5 deg with pain at end range, limited ambulation tolerance , difficulty with ambulation on stairs  Last PN: DF 6* still   Current: Right ankle DF 15 deg (NWB), progressing          Long Term Goals: To be accomplished in 6 weeks:                           1. Improved FOTO score to >or= 51/100 as evidence of improved function and return to community ambulation  Status at Eval: FOTO 33/100  Current: FOTO knee 36/100, ankle 37/100, minimal improvement, progressing      2. Ability to perform sit to stand transfers without use of hands as evidence of improved LE strength  Status at Eval: required to use hands for transfers  Last PN: Continues to require both UE  Current:  NT      3. Ability to return to community ambulation with minimal or no limp  Status at Eval: antalgic gait pattern  Last PN: Continued antalgic gait, however decreased with use of cane   Current: ambulation without SC, residual knee guarding and incomplete TKE during ambulation, progressing      4.  Reduction of pain to <or= 3/10 with ADL to allow retutn to PLOF  STatus at Eval: pain ranging from 0-9/10 nicole with ambulation   Last PN: pain ranging 8/10 with walking increasing pain   Current: 4/10 right knee, 5/10 right ankle/heel, progressing    PLAN  [x]  Upgrade activities as tolerated     [x]  Continue plan of care  []  Update interventions per flow sheet       []  Discharge due to:_  []  Other:_      Elizabeth Douglas PT 2/2/2018  11:37 AM    Future Appointments  Date Time Provider Wilder Padilla   2/6/2018 10:00 AM KALA Myles [facility]   2/8/2018 9:30 AM KALA Phillips [facility]

## 2018-02-06 ENCOUNTER — HOSPITAL ENCOUNTER (OUTPATIENT)
Dept: PHYSICAL THERAPY | Age: 63
Discharge: HOME | End: 2018-02-06
Payer: MEDICAID

## 2018-02-06 PROCEDURE — 97110 THERAPEUTIC EXERCISES: CPT

## 2018-02-06 PROCEDURE — 97140 MANUAL THERAPY 1/> REGIONS: CPT

## 2018-02-06 NOTE — PROGRESS NOTES
PT DAILY TREATMENT NOTE - [facility]     Patient Name: Isidoro Bernal  Date:2018  : 1955  [x]  Patient  Verified  Payor: [facility] MEDICAID / Plan: [place] [employer] [facility] / Product Type: Managed Care Medicaid /    In time:1030  Out time:1105  Total Treatment Time (min): 35  Visit #: 7 of 12    Treatment Area: Acute pain of right knee [M25.561]  Right ankle pain [M25.571]    SUBJECTIVE  Pain Level (0-10 scale): 5 knee, 0 ankle  Any medication changes, allergies to medications, adverse drug reactions, diagnosis change, or new procedure performed?: [x] No    [] Yes (see summary sheet for update)  Subjective functional status/changes:   [] No changes reported  \"I guess the tape helped a little bit. It is hurting less today. \"     OBJECTIVE        25 min Therapeutic Exercise:  [x] See flow sheet :ROM, flexibility, strength   Rationale: increase ROM, increase strength and improve coordination to improve the patients ability to perform daily activities with decreased pain and symptom levels    10 min Manual Therapy:  Functional massage to right quad in seated position, application of Kinesiotape with tension over right knee/quad   Rationale: decrease pain, increase ROM and increase tissue extensibility to perform daily activities with decreased pain and symptom levels              With   [] TE   [] TA   [] neuro   [] other: Patient Education: [x] Review HEP    [] Progressed/Changed HEP based on:   [] positioning   [] body mechanics   [] transfers   [] heat/ice application    [] other:      Other Objective/Functional Measures:   TTP right anterior and lateral knee  No antalgia    Pain Level (0-10 scale) post treatment: 5    ASSESSMENT/Changes in Function: good tolerance to activities    Patient will continue to benefit from skilled PT services to modify and progress therapeutic interventions, address functional mobility deficits, address ROM deficits, address strength deficits, analyze and address soft tissue restrictions, analyze and modify body mechanics/ergonomics and instruct in home and community integration to attain remaining goals. [x]  See Plan of Care  []  See progress note/recertification  []  See Discharge Summary         Progress towards goals / Updated goals:  Short Term Goals: To be accomplished in 3 weeks:                           1. Improved knee ROM to >or= 5-130 deg for return to PLOF  Status at Eval: Right knee Flex 120, Ext -20  Last PN: Right knee Flex 115, Ext -5  Current: Active , progressing      2. Improved right ankle DF to >or= 20 deg to allow return to normal gait pattern on flat surface and stairs  Status at Eval: DF right 5 deg with pain at end range, limited ambulation tolerance , difficulty with ambulation on stairs  Last PN: DF 6* still   Current: Right ankle DF 15 deg (NWB), progressing          Long Term Goals: To be accomplished in 6 weeks:                           1. Improved FOTO score to >or= 51/100 as evidence of improved function and return to community ambulation  Status at Eval: FOTO 33/100  Current: FOTO knee 36/100, ankle 37/100, minimal improvement, progressing      2. Ability to perform sit to stand transfers without use of hands as evidence of improved LE strength  Status at Eval: required to use hands for transfers  Last PN: Continues to require both UE  Current:  NT      3. Ability to return to community ambulation with minimal or no limp  Status at Eval: antalgic gait pattern  Last PN: Continued antalgic gait, however decreased with use of cane   Current: ambulation without SC, residual knee guarding and incomplete TKE during ambulation, progressing      4.  Reduction of pain to <or= 3/10 with ADL to allow retutn to PLOF  STatus at Eval: pain ranging from 0-9/10 nicole with ambulation   Last PN: pain ranging 8/10 with walking increasing pain   Current: 4/10 right knee, 5/10 right ankle/heel, progressing    PLAN  [x]  Upgrade activities as tolerated     [x] Continue plan of care  []  Update interventions per flow sheet       []  Discharge due to:_  []  Other:_      Laci Lux PT 2/6/2018  11:37 AM    Future Appointments  Date Time Provider Wilder Padilla   2/8/2018 9:30 AM Laci Lux PT [facility]

## 2018-02-08 ENCOUNTER — HOSPITAL ENCOUNTER (OUTPATIENT)
Dept: PHYSICAL THERAPY | Age: 63
Discharge: HOME | End: 2018-02-08
Payer: MEDICAID

## 2018-02-08 PROCEDURE — 97140 MANUAL THERAPY 1/> REGIONS: CPT

## 2018-02-08 PROCEDURE — 97110 THERAPEUTIC EXERCISES: CPT

## 2018-02-08 NOTE — PROGRESS NOTES
PT DAILY TREATMENT NOTE - [facility]     Patient Name: Vivian Floyd  Date:2018  : 1955  [x]  Patient  Verified  Payor: [facility] MEDICAID / Plan: [place] [employer] [facility] [employer] / Product Type: Managed Care Medicaid /    In time:945  Out time:1030  Total Treatment Time (min): 45  Visit #: 8 of 12    Treatment Area: Acute pain of right knee [M25.561]  Right ankle pain [M25.571]    SUBJECTIVE  Pain Level (0-10 scale): 5 knee, 0 ankle  Any medication changes, allergies to medications, adverse drug reactions, diagnosis change, or new procedure performed?: [x] No    [] Yes (see summary sheet for update)  Subjective functional status/changes:   [] No changes reported  \"My knee still hurts\"    OBJECTIVE      35 min Therapeutic Exercise:  [x] See flow sheet :ROM, flexibility, strength   Rationale: increase ROM, increase strength and improve coordination to improve the patients ability to perform daily activities with decreased pain and symptom levels    10 min Manual Therapy:  Functional massage to right quad in seated position, patellar mobs, MWM with mild tibial Rotation   Rationale: decrease pain, increase ROM and increase tissue extensibility to perform daily activities with decreased pain and symptom levels              With   [] TE   [] TA   [] neuro   [] other: Patient Education: [x] Review HEP    [] Progressed/Changed HEP based on:   [] positioning   [] body mechanics   [] transfers   [] heat/ice application    [] other:      Other Objective/Functional Measures:   TTP right anterior and lateral knee  Max knee Flex 125, pain at end range  Lacking full right knee Ext -10 deg, added prone knee hang      Pain Level (0-10 scale) post treatment: 6    ASSESSMENT/Changes in Function: good tolerance to activities    Patient will continue to benefit from skilled PT services to modify and progress therapeutic interventions, address functional mobility deficits, address ROM deficits, address strength deficits, analyze and address soft tissue restrictions, analyze and modify body mechanics/ergonomics and instruct in home and community integration to attain remaining goals. [x]  See Plan of Care  []  See progress note/recertification  []  See Discharge Summary         Progress towards goals / Updated goals:  Short Term Goals: To be accomplished in 3 weeks:                           1. Improved knee ROM to >or= 5-130 deg for return to PLOF  Status at Eval: Right knee Flex 120, Ext -20  Last PN: Right knee Flex 115, Ext -5  Current: Active , progressing      2. Improved right ankle DF to >or= 20 deg to allow return to normal gait pattern on flat surface and stairs  Status at Eval: DF right 5 deg with pain at end range, limited ambulation tolerance , difficulty with ambulation on stairs  Last PN: DF 6* still   Current: Right ankle DF 15 deg (NWB), progressing          Long Term Goals: To be accomplished in 6 weeks:                           1. Improved FOTO score to >or= 51/100 as evidence of improved function and return to community ambulation  Status at Eval: FOTO 33/100  Current: FOTO knee 36/100, ankle 37/100, minimal improvement, progressing      2. Ability to perform sit to stand transfers without use of hands as evidence of improved LE strength  Status at Eval: required to use hands for transfers  Last PN: Continues to require both UE  Current:  NT      3. Ability to return to community ambulation with minimal or no limp  Status at Eval: antalgic gait pattern  Last PN: Continued antalgic gait, however decreased with use of cane   Current: ambulation without SC, residual knee guarding and incomplete TKE during ambulation, progressing      4.  Reduction of pain to <or= 3/10 with ADL to allow retutn to PLOF  STatus at Eval: pain ranging from 0-9/10 nicole with ambulation   Last PN: pain ranging 8/10 with walking increasing pain   Current: 4/10 right knee, 5/10 right ankle/heel, progressing    PLAN  [x]  Upgrade activities as tolerated     [x]  Continue plan of care  []  Update interventions per flow sheet       []  Discharge due to:_  []  Other:_      Mayda Bingham, PT 2/8/2018  11:37 AM    No future appointments.

## 2018-02-13 ENCOUNTER — HOSPITAL ENCOUNTER (OUTPATIENT)
Dept: PHYSICAL THERAPY | Age: 63
Discharge: HOME | End: 2018-02-13
Payer: MEDICAID

## 2018-02-13 PROCEDURE — 97140 MANUAL THERAPY 1/> REGIONS: CPT

## 2018-02-13 PROCEDURE — 97110 THERAPEUTIC EXERCISES: CPT

## 2018-02-13 NOTE — PROGRESS NOTES
PT DAILY TREATMENT NOTE     Patient Name: Malick George  Date:2018  : 1955  [x]  Patient  Verified  Payor: [facility] MEDICAID / Plan: [place] [employer] / Product Type: Managed Care Medicaid /    In time:9:43  Out time:10:27  Total Treatment Time (min): 44  Visit #: 9 of 12    Treatment Area: Acute pain of right knee [M25.561]  Right ankle pain [M25.571]    SUBJECTIVE  Pain Level (0-10 scale): 6  Any medication changes, allergies to medications, adverse drug reactions, diagnosis change, or new procedure performed?: [x] No    [] Yes (see summary sheet for update)  Subjective functional status/changes:   [] No changes reported  \"My right knee still really huts in the front and around the back especially first thing in the morning. \"    OBJECTIVE      34 min Therapeutic Exercise:  [x] See flow sheet :   Rationale: increase ROM, increase strength and improve coordination to improve the patients ability to perform daily activities with decreased pain and symptom levels      10 min Manual Therapy:  STM to distal quad, patella tendon, patella mobs, STM to medial gastroc   Rationale: decrease pain, increase ROM and increase tissue extensibility to perform daily activities with decreased pain and symptom levels          With   [] TE   [] TA   [] neuro   [] other: Patient Education: [x] Review HEP    [] Progressed/Changed HEP based on:   [] positioning   [] body mechanics   [] transfers   [] heat/ice application    [] other:      Other Objective/Functional Measures:   7-120* right knee AROM     Pain Level (0-10 scale) post treatment: 5     ASSESSMENT/Changes in Function: Continues to c/o anterior and posterior right knee pain especially first thing in the morning. Pt educated to complete some exercises at that time to help decrease stiffness. Favors left LE with stairs needing cues to use right LE as well.      Patient will continue to benefit from skilled PT services to modify and progress therapeutic interventions, address functional mobility deficits, address ROM deficits, address strength deficits, analyze and modify body mechanics/ergonomics and instruct in home and community integration to attain remaining goals. []  See Plan of Care  []  See progress note/recertification  []  See Discharge Summary         Progress towards goals / Updated goals:  Short Term Goals: To be accomplished in 3 weeks:                           1. Improved knee ROM to >or= 5-130 deg for return to PLOF  Status at Eval: Right knee Flex 120, Ext -20  Last PN: Right knee Flex 115, Ext -5  Current: Active 7-125, progressing      2. Improved right ankle DF to >or= 20 deg to allow return to normal gait pattern on flat surface and stairs  Status at Eval: DF right 5 deg with pain at end range, limited ambulation tolerance , difficulty with ambulation on stairs  Last PN: DF 6* still   Current: Right ankle DF 15 deg (NWB), progressing          Long Term Goals: To be accomplished in 6 weeks:                           1. Improved FOTO score to >or= 51/100 as evidence of improved function and return to community ambulation  Status at Eval: FOTO 33/100  Current: FOTO knee 36/100, ankle 37/100, minimal improvement, will reassess next visit  progressing      2. Ability to perform sit to stand transfers without use of hands as evidence of improved LE strength  Status at Eval: required to use hands for transfers  Last PN: Continues to require both UE  Current:  10 sit to stands without UE goal Met      3. Ability to return to community ambulation with minimal or no limp  Status at Eval: antalgic gait pattern  Last PN: Continued antalgic gait, however decreased with use of cane   Current: ambulation without SC, residual knee guarding and incomplete TKE during ambulation, progressing      4.  Reduction of pain to <or= 3/10 with ADL to allow retutn to PLOF  STatus at Eval: pain ranging from 0-9/10 nicole with ambulation   Last PN: pain ranging 8/10 with walking increasing pain   Current: 4/10 right knee, 5/10 right ankle/heel, progressing    PLAN  [x]  Upgrade activities as tolerated     [x]  Continue plan of care  []  Update interventions per flow sheet       []  Discharge due to:_  []  Other:_      Ella Donato 2/13/2018  9:44 AM    Future Appointments  Date Time Provider Wilder Padilla   2/15/2018 9:00 AM Becky TELLES [facility]

## 2018-02-15 ENCOUNTER — HOSPITAL ENCOUNTER (OUTPATIENT)
Dept: PHYSICAL THERAPY | Age: 63
Discharge: HOME | End: 2018-02-15
Payer: MEDICAID

## 2018-02-15 PROCEDURE — 97140 MANUAL THERAPY 1/> REGIONS: CPT

## 2018-02-15 PROCEDURE — 97110 THERAPEUTIC EXERCISES: CPT

## 2018-02-15 NOTE — PROGRESS NOTES
PT DAILY TREATMENT NOTE     Patient Name: Isidoro Bernal  Date:2/15/2018  : 1955  [x]  Patient  Verified  Payor: [facility] MEDICAID / Plan: [facility] / Product Type: Managed Care Medicaid /    In time:9:05  Out time:10:06  Total Treatment Time (min): 61  Visit #: 10 of 12    Treatment Area: Acute pain of right knee [M25.561]  Right ankle pain [M25.571]    SUBJECTIVE  Pain Level (0-10 scale): 4  Any medication changes, allergies to medications, adverse drug reactions, diagnosis change, or new procedure performed?: [x] No    [] Yes (see summary sheet for update)  Subjective functional status/changes:   [] No changes reported  \"My knee is feeling better. I feel about 70% better since starting therapy. \"    OBJECTIVE    49 min Therapeutic Exercise:  [x] See flow sheet :   Rationale: increase ROM, increase strength and improve coordination to improve the patients ability to perform daily activities with decreased pain and symptom levels    12 min Manual Therapy:  STM to distal quad, patella mobs in supine. STM to HS and gasrtoc in prone   Rationale: decrease pain, increase ROM and increase tissue extensibility to perform daily activities with decreased pain and symptom levels          With   [] TE   [] TA   [] neuro   [] other: Patient Education: [x] Review HEP    [] Progressed/Changed HEP based on:   [] positioning   [] body mechanics   [] transfers   [] heat/ice application    [] other:      Other Objective/Functional Measures:   See updated goals below  TTP over medial proximal gastroc   FOTO score 36/100 knee, 47/100 ankle    Pain Level (0-10 scale) post treatment: 4    ASSESSMENT/Changes in Function: Pt is making progress towards goals with reporting 70% improvement since starting therapy. Right knee pain increases with increased activity with max 9/10. Describes pain as achy constantly across joint line and patella.  Continues to c/o right ankle pain still as well however overall decreased compared to knee. ROM continues to be decreased with reaching 7-125* actively. Demonstrates antalgic gait with decreased right terminal knee extension. Pt would benefit from continued skilled PT services to address gait mechanics, ROM, and strength to allow pt to complete daily functional activities with less pain. Patient will continue to benefit from skilled PT services to modify and progress therapeutic interventions, address functional mobility deficits, address ROM deficits, address strength deficits, analyze and modify body mechanics/ergonomics and instruct in home and community integration to attain remaining goals. []  See Plan of Care  []  See progress note/recertification  []  See Discharge Summary         Progress towards goals / Updated goals:  Short Term Goals: To be accomplished in 3 weeks:                           1. Improved knee ROM to >or= 5-130 deg for return to PLOF  Status at Eval: Right knee Flex 120, Ext -20  Last PN: Right knee Flex 115, Ext -5  Current: Active 7-125, progressing      2. Improved right ankle DF to >or= 20 deg to allow return to normal gait pattern on flat surface and stairs  Status at Eval: DF right 5 deg with pain at end range, limited ambulation tolerance , difficulty with ambulation on stairs  Last PN: DF 6* still   Current: Right ankle DF 15 deg (NWB), progressing          Long Term Goals: To be accomplished in 6 weeks:                           1. Improved FOTO score to >or= 51/100 as evidence of improved function and return to community ambulation  Status at Eval: FOTO 33/100  Last PN: FOTO knee 36/100, ankle 37/100, minimal improvement  Current:  Knee 36/100, ankle 47/100 progressing       2. Ability to perform sit to stand transfers without use of hands as evidence of improved LE strength  Status at Eval: required to use hands for transfers  Last PN: Continues to require both UE  Current:  10 sit to stands without UE goal Met      3.  Ability to return to community ambulation with minimal or no limp  Status at Eval: antalgic gait pattern  Last PN: Continued antalgic gait, however decreased with use of cane   Current: 1/2 mile before rest without cane progressing       4.  Reduction of pain to <or= 3/10 with ADL to allow retutn to PLOF  STatus at Eval: pain ranging from 0-9/10 nicole with ambulation   Last PN: pain ranging 8/10 with walking increasing pain   Current: 4/10 right knee, 5/10 right anklle progressing    PLAN  [x]  Upgrade activities as tolerated     [x]  Continue plan of care  []  Update interventions per flow sheet       []  Discharge due to:_  []  Other:_      Opal Sen 2/15/2018  9:12 AM    Future Appointments  Date Time Provider Wilder Padilla   2/20/2018 10:30 AM Opal TELLES [facility]   2/22/2018 9:30 AM Opal TELLES THE [facility]

## 2018-02-15 NOTE — PROGRESS NOTES
[facility] in [address] [place], [facility] [address]  Phone: [phone]      Fax:  [phone]    Physical Therapy Progress Note  Patient name: Ann Joel Start of Care: 11/15/17   Referral source: Abena Zendejas MD : 1955   Medical/Treatment Diagnosis: Acute pain of right knee [M25.561]  Right ankle pain [M25.571] Onset Date:10/31/17 right knee, >5 years right ankle     Prior Hospitalization: see medical history Provider#: [id number]   Medications: Verified on Patient Summary List    Comorbidities: arthritis, high BP, chronic right ankle pain after sprain in   Prior Level of Function: chronic right ankle pain    Visits from Start of Care: 10    Missed Visits: [address] be accomplished in 3 weeks:                           1. Improved knee ROM to >or= 5-130 deg for return to PLOF  Status at Eval: Right knee Flex 120, Ext -20  Last PN: Right knee Flex 115, Ext -5  Current: Active 7-125, progressing      2. Improved right ankle DF to >or= 20 deg to allow return to normal gait pattern on flat surface and stairs  Status at Eval: DF right 5 deg with pain at end range, limited ambulation tolerance , difficulty with ambulation on stairs  Last PN: DF 6* still   Current: Right ankle DF 15 deg (NWB), progressing          Long Term Goals: To be accomplished in 6 weeks:                           1. Improved FOTO score to >or= 51/100 as evidence of improved function and return to community ambulation  Status at Eval: FOTO 33/100  Last PN: FOTO knee 36/100, ankle 37/100, minimal improvement  Current:  Knee 36/100, ankle 47/100 progressing       2. Ability to perform sit to stand transfers without use of hands as evidence of improved LE strength  Status at Eval: required to use hands for transfers  Last PN: Continues to require both UE  Current:  10 sit to stands without UE goal Met      3.  Ability to return to community ambulation with minimal or no limp  Status at Eval: antalgic gait pattern  Last PN: Continued antalgic gait, however decreased with use of cane   Current: 1/2 mile before rest without cane progressing       4. Reduction of pain to <or= 3/10 with ADL to allow retutn to PLOF  STatus at Eval: pain ranging from 0-9/10 nicole with ambulation   Last PN: pain ranging 8/10 with walking increasing pain   Current: 4/10 right knee, 5/10 right anklle progressing         Key Functional Changes:  Pt is making progress towards goals with reporting 70% improvement since starting therapy. Right knee pain increases with increased activity with max 9/10. Describes pain as achy constantly across joint line and patella. Continues to c/o right ankle pain still as well however overall decreased compared to knee. ROM continues to be decreased with reaching 7-125* actively. Demonstrates antalgic gait with decreased right terminal knee extension.  Pt would benefit from continued skilled PT services to address gait mechanics, ROM, and strength to allow pt to complete daily functional activities with less pain.      Updated Goals: to be achieved in 3 weeks:   See above    ASSESSMENT/RECOMMENDATIONS:  [x]Continue therapy per initial plan/protocol at a frequency of  2 x per week for 3 weeks  []Continue therapy with the following recommended changes:_____________________      _____________________________________________________________________  []Discontinue therapy progressing towards or have reached established goals  []Discontinue therapy due to lack of appreciable progress towards goals  []Discontinue therapy due to lack of attendance or compliance  []Await Physician's recommendations/decisions regarding therapy  []Other:________________________________________________________________    Thank you for this referral.   Abbie Donato 2/15/2018 9:54 AM    NOTE TO PHYSICIAN:  PLEASE COMPLETE THE ORDERS BELOW AND   FAX TO [facility]: [phone]  If you are unable to process this request in 24 hours please contact our office: [phone]      ____ I have read the above report and request that my patient continue therapy with the following changes/special instructions:  ____ I have read the above report and request that my patient be discharged from therapy    Physician's Signature:_____________________ Date:___________Time:__________

## 2018-02-20 ENCOUNTER — APPOINTMENT (OUTPATIENT)
Dept: PHYSICAL THERAPY | Age: 63
End: 2018-02-20
Payer: MEDICAID

## 2018-02-22 ENCOUNTER — HOSPITAL ENCOUNTER (OUTPATIENT)
Dept: PHYSICAL THERAPY | Age: 63
End: 2018-02-22
Payer: MEDICAID

## 2018-02-27 ENCOUNTER — HOSPITAL ENCOUNTER (OUTPATIENT)
Dept: PHYSICAL THERAPY | Age: 63
End: 2018-02-27
Payer: MEDICAID

## 2018-04-02 NOTE — PROGRESS NOTES
[facility] in [address] [place] [address]  Phone: [phone]      Fax:  [phone]    Discharge Summary      Patient name: Sydnee Nolan     Start of Care: 11/15/17  Referral source: Dagoberto Riojas MD    : 1955  Medical/Treatment Diagnosis: Acute pain of right knee [M25.561]  Right ankle pain [M25.571]  Onset Date:10/31/17 right knee, > 5 years right ankle  Prior Hospitalization: see medical history   Provider#: [id number]  Comorbidities: arthritis, high BP, chronic right ankle pain after sprain in   Prior Level of Function:chronic right ankle pain  Medications: Verified on Patient Summary List    Visits from Start of Care: 10    Missed Visits: 8  Reporting Period : 11/15/17 to 2/15/18    Short Term Goals: To be accomplished in 3 weeks:                           1. Improved knee ROM to >or= 5-130 deg for return to PLOF  Status at Eval: Right knee Flex 120, Ext -20  Last PN: Right knee Flex 115, Ext -5  Current: Active 7-125, progressing      2. Improved right ankle DF to >or= 20 deg to allow return to normal gait pattern on flat surface and stairs  Status at Eval: DF right 5 deg with pain at end range, limited ambulation tolerance , difficulty with ambulation on stairs  Last PN: DF 6* still   Current: Right ankle DF 15 deg (NWB), progressing          Long Term Goals: To be accomplished in 6 weeks:                           1. Improved FOTO score to >or= 51/100 as evidence of improved function and return to community ambulation  Status at Eval: FOTO 33/100  Last PN: FOTO knee 36/100, ankle 37/100, minimal improvement  Current:  Knee 36/100, ankle 47/100 progressing       2. Ability to perform sit to stand transfers without use of hands as evidence of improved LE strength  Status at Eval: required to use hands for transfers  Last PN: Continues to require both UE  Current:  10 sit to stands without UE goal Met      3.  Ability to return to community ambulation with minimal or no limp  Status at Eval: antalgic gait pattern  Last PN: Continued antalgic gait, however decreased with use of cane   Current: 1/2 mile before rest without cane progressing       4. Reduction of pain to <or= 3/10 with ADL to allow retutn to PLOF  STatus at Eval: pain ranging from 0-9/10 nicole with ambulation   Last PN: pain ranging 8/10 with walking increasing pain   Current: 4/10 right knee, 5/10 right anklle progressing    Assessment/ Summary of Care: Pt presented to therapy with c/c right knee pain and 5 year history of fluctuating right ankle pain. Pt attended 10 sessions focusing on improving ROM, flexibility, LE strength and gait mechanics. Per last session pt reported 70% improvement with continued decreased ROM, antalgic gait, and pain along joint line and patella. Pt is ready to be discharged at this time due to non compliance with therapy with no more scheduled appointments despite leaving messages.      RECOMMENDATIONS:  [x]Discontinue therapy: []Patient has reached or is progressing toward set goals      [x]Patient is non-compliant or has abdicated      []Due to lack of appreciable progress towards set goals    Shawnee Donato 4/2/2018 12:20 PM

## 2018-11-01 ENCOUNTER — HOSPITAL ENCOUNTER (OUTPATIENT)
Dept: PHYSICAL THERAPY | Age: 63
Discharge: HOME | End: 2018-11-01
Payer: MEDICAID

## 2018-11-01 PROCEDURE — 97162 PT EVAL MOD COMPLEX 30 MIN: CPT | Performed by: PHYSICAL THERAPIST

## 2018-11-01 PROCEDURE — 97110 THERAPEUTIC EXERCISES: CPT | Performed by: PHYSICAL THERAPIST
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
## 2018-11-02 ENCOUNTER — HOSPITAL ENCOUNTER (OUTPATIENT)
Dept: PHYSICAL THERAPY | Age: 63
Discharge: HOME | End: 2018-11-02
Payer: MEDICAID

## 2018-11-02 PROCEDURE — 97140 MANUAL THERAPY 1/> REGIONS: CPT | Performed by: PHYSICAL THERAPIST

## 2018-11-02 PROCEDURE — 97110 THERAPEUTIC EXERCISES: CPT | Performed by: PHYSICAL THERAPIST

## 2018-11-02 NOTE — PROGRESS NOTES
PT DAILY TREATMENT NOTE -  Patient Name: Isaiah Basurto Date:2018 : 1955 [x]  Patient  Verified Payor: [facility] MEDICAID / Plan: [address] [place] [address] / Product Type: Managed Care Medicaid / In ITOD:[id number]  Out time:1014 Total Treatment Time (min): 54 Visit #: 2 of 16 Treatment Area: No admission diagnoses are documented for this encounter. SUBJECTIVE Pain Level (0-10 scale): 6 Any medication changes, allergies to medications, adverse drug reactions, diagnosis change, or new procedure performed?: [x] No    [] Yes (see summary sheet for update) Subjective functional status/changes:   [] No changes reported Pt states did ok following yesterday eval went home and rested and took hot shower OBJECTIVE Modality rationale: decrease pain and increase tissue extensibility to improve the patients ability to relax and perform ROM mobility ex with less pain and tension Type Additional Details  
[] Estim:  []Unatt       []IFC  []Premod []Other:  []w/ice   []w/heat Position: Location:  
[] Estim: []Att    []TENS instruct  []NMES []Other:  []w/US   []w/ice   []w/heat Position: Location:  
[]  Traction: [] Cervical       []Lumbar 
                     [] Prone          []Supine []Intermittent   []Continuous Lbs: 
[] before manual 
[] after manual  
[]  Ultrasound: []Continuous   [] Pulsed []1MHz   []3MHz W/cm2: 
Location:  
[]  Iontophoresis with dexamethasone Location: [] Take home patch  
[] In clinic  
[]  Ice     [x]  Heat x 15 -20 min during supine Flexion , ER and manual  
[]  Ice massage 
[]  Laser  
[]  Anodyne Position:supine Location:neck and left post shoulder   
[]  Laser with stim 
[]  Other:  Position: Location:  
[]  Vasopneumatic Device Pressure:       [] lo [] med [] hi  
Temperature: [] lo [] med [] hi  
 [] Skin assessment post-treatment:  []intact []redness- no adverse reaction 
  []redness  adverse reaction:  
 
 
42 min Therapeutic Exercise:  [x] See flow sheet :  
Rationale: increase ROM and increase strength to improve the patients ability to regain full functional mobility return to PLOF with less pain 12 min Manual Therapy:  PROM pt in supine , tactile cues with ex Rationale: decrease pain, increase ROM, increase tissue extensibility, decrease trigger points and increase postural awareness to return to PLOF With 
 [] TE 
 [] TA 
 [] neuro 
 [] other: Patient Education: [x] Review HEP [] Progressed/Changed HEP based on:  
[] positioning   [] body mechanics   [] transfers   [] heat/ice application   
[] other:   
 
Other Objective/Functional Measures: Flexion sitting left UE to 115 on pulley , scaption 90 deg Finger walk on wall ladder reaches #33 with left , 35 with right Supine ER 74deg Flexion left 134 deg PROM , abd/scaption PROM 160 deg supine Pt issued yellow theraband for home rows and extension  Ex Pain Level (0-10 scale) post treatment: 6 
 
ASSESSMENT/Changes in Function: slow motion due to pain but pt tolerated exercises well , joint motion with PROM is smoothness with cues to relax and let go of tension , no increase pain at end of session Patient will continue to benefit from skilled PT services to modify and progress therapeutic interventions, address functional mobility deficits, address ROM deficits, address strength deficits, analyze and address soft tissue restrictions, analyze and cue movement patterns, analyze and modify body mechanics/ergonomics and assess and modify postural abnormalities to attain remaining goals. [x]  See Plan of Care 
[]  See progress note/recertification 
[]  See Discharge Summary Progress towards goals / Updated goals: 
Short Term Goals: To be accomplished in 4 weeks: 1.  Pt will be compliant with HEP for max progression toward all goals and return to PLOF. Eval:started on HEP and given handout of ex , initial focus on gentle neck , UE and trunk ROM  
Current: NA 
  
2.Pt pain will improve >= 40% to allow pt improved sleep and tolerance to daily activity. Eval:pain 6-8/10  
Current: NA 
  
3. Pt FOTO score will increase by >=10 points to show improvement in functional mobility. Eval:32/100  
Current: will address at visit 5 
  
  
Long Term Goals: To be accomplished in 8 weeks: 1.  Pt will be independant with HEP for continued and ongoing progression following discharge toward full functional mobility. Eval:started on HEP  
Current: NA 
  
2.Pt pain will improve >= 80% to allow pt return to PLOF. Eval:pain 6-8/10  
Current: NA 
  
3. Pt FOTO score will increase by >=27  points to show improvement in functional mobility. Eval:32/100 Current: will reassess every 5th visit  
  
4. Pt strength will improve to 5/5  to allow pt to return to full functional activity and PLOF  
Eval: left shoulder flexion /Abd 2/5 limited motion,IR 4/5 , ER 3-/5 , 3/5 bicep , tricep and wrist 4+/5 pain limiting resist today , right shoulder 3-4/5 grossly  
Current: NA 
  
5. Pt AROM will improve to >=160 deg flexion and Abd  to allow pt to return to full functional reach activity, tolerated putting dishes away in cupboards .  
Eval: right shoulder 130 deg flexion and abd , left shoulder 110 deg flexion , 79 deg abd  
Current: NA 
  
6.  Pt will be able to have full functional cervical rotation ROM to allow ease with clearing traffic to rear while driving . 
Eval:cerv ROtation right 35 , left 19 deg  
Current: NA 
 
 
 
PLAN [x]  Upgrade activities as tolerated     [x]  Continue plan of care 
[]  Update interventions per flow sheet      
[]  Discharge due to:_ 
[]  Other:_ Corazon Kim, PT 11/2/2018  9:22 AM 
 
Future Appointments Date Time Provider Wilder Padilla 11/2/2018  9:30 AM Aileen Barrett, PT [facility]  
11/6/2018  2:00 PM Michaela Marley PTA [facility]  
11/8/2018  4:00 PM Phillip Jain [facility]

## 2018-11-06 ENCOUNTER — APPOINTMENT (OUTPATIENT)
Dept: PHYSICAL THERAPY | Age: 63
End: 2018-11-06
Payer: MEDICAID

## 2018-11-08 ENCOUNTER — APPOINTMENT (OUTPATIENT)
Dept: PHYSICAL THERAPY | Age: 63
End: 2018-11-08
Payer: MEDICAID

## 2018-11-13 ENCOUNTER — HOSPITAL ENCOUNTER (OUTPATIENT)
Dept: PHYSICAL THERAPY | Age: 63
Discharge: HOME | End: 2018-11-13
Payer: MEDICAID

## 2018-11-13 PROCEDURE — 97110 THERAPEUTIC EXERCISES: CPT
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
## 2018-11-15 ENCOUNTER — HOSPITAL ENCOUNTER (OUTPATIENT)
Dept: PHYSICAL THERAPY | Age: 63
Discharge: HOME | End: 2018-11-15
Payer: MEDICAID

## 2018-11-15 PROCEDURE — 97110 THERAPEUTIC EXERCISES: CPT

## 2018-11-15 NOTE — PROGRESS NOTES
PT DAILY TREATMENT NOTE -  Patient Name: Wayne Bobby Date:11/15/2018 : 1955 [x]  Patient  Verified Payor: [facility] MEDICAID / Plan: [address] [place] [address] / Product Type: Managed Care Medicaid / In time:1405  Out time: 5672 Total Treatment Time (min):  
 
Visit #: 4 of 16 Treatment Area: Cervicalgia [M54.2] Sprain of joints and ligaments of unspecified parts of neck, initial encounter [S13. 9XXA] SUBJECTIVE Pain Level (0-10 scale): 5/10 Any medication changes, allergies to medications, adverse drug reactions, diagnosis change, or new procedure performed?: [x] No    [] Yes (see summary sheet for update) Subjective functional status/changes:   [] No changes reported Reports feeling \"a little better. \" OBJECTIVE Modality rationale: MH to L shoulder/neck x 10 min  decrease pain and increase tissue extensibility to improve the patients ability to relax and perform ROM mobility ex with less pain and tension  
[x] Skin assessment post-treatment:  [x]intact []redness- no adverse reaction 
  []redness  adverse reaction:  
 
 
30 min Therapeutic Exercise:  [x] See flow sheet :  
Rationale: increase ROM and increase strength to improve the patients ability to regain full functional mobility return to PLOF with less pain 5 min Manual Therapy: Bilat shoulder PROM, emphasis on subscapularis/latissimus stretch; bilat first rib inf mob Rationale: increase ROM and increase strength to improve the patients ability to regain full functional mobility return to PLOF with less pain With 
 [x] TE 
 [] TA 
 [] neuro 
 [] other: Patient Education: [x] Review HEP [] Progressed/Changed HEP based on:  
[] positioning   [] body mechanics   [] transfers   [] heat/ice application   
[] other:   
 
 
Pain Level (0-10 scale) post treatment: 5/10 ASSESSMENT/Changes in Function: Tolerated well today.   Difficulty raising UEs to complete overhead movements. Improved postural awareness. Patient will continue to benefit from skilled PT services to modify and progress therapeutic interventions, address functional mobility deficits, address ROM deficits, address strength deficits, analyze and address soft tissue restrictions, analyze and cue movement patterns, analyze and modify body mechanics/ergonomics and assess and modify postural abnormalities to attain remaining goals. [x]  See Plan of Care 
[]  See progress note/recertification 
[]  See Discharge Summary Progress towards goals / Updated goals: 
Short Term Goals: To be accomplished in 4 weeks: 1.  Pt will be compliant with HEP for max progression toward all goals and return to PLOF. Eval:started on HEP and given handout of ex , initial focus on gentle neck , UE and trunk ROM  
Current: NA 
  
2.Pt pain will improve >= 40% to allow pt improved sleep and tolerance to daily activity. Eval:pain 6-8/10  
Current: NA 
  
3. Pt FOTO score will increase by >=10 points to show improvement in functional mobility. Eval:32/100  
Current: will address at visit 5 
  
  
Long Term Goals: To be accomplished in 8 weeks: 1.  Pt will be independant with HEP for continued and ongoing progression following discharge toward full functional mobility. Eval:started on HEP  
Current: NA 
  
2.Pt pain will improve >= 80% to allow pt return to PLOF. Eval:pain 6-8/10  
Current: NA 
  
3. Pt FOTO score will increase by >=27  points to show improvement in functional mobility. Eval:32/100 Current: will reassess every 5th visit  
  
4. Pt strength will improve to 5/5  to allow pt to return to full functional activity and PLOF  
Eval: left shoulder flexion /Abd 2/5 limited motion,IR 4/5 , ER 3-/5 , 3/5 bicep , tricep and wrist 4+/5 pain limiting resist today , right shoulder 3-4/5 grossly  
Current: NA 
  
5.  Pt AROM will improve to >=160 deg flexion and Abd  to allow pt to return to full functional reach activity, tolerated putting dishes away in cupboards .  
Eval: right shoulder 130 deg flexion and abd , left shoulder 110 deg flexion , 79 deg abd  
Current: NA 
  
6.  Pt will be able to have full functional cervical rotation ROM to allow ease with clearing traffic to rear while driving . 
Eval:cerv ROtation right 35 , left 19 deg  
Current: NA 
 
 
 
PLAN [x]  Upgrade activities as tolerated     [x]  Continue plan of care 
[]  Update interventions per flow sheet      
[]  Discharge due to:_ 
[]  Other:_   
 
Eve Irizarry DPT, [facility] 11/15/2018  1416 PM 
 
Future Appointments Date Time Provider Wilder Padilla 11/21/2018  2:45 PM Missael Hawkins PTA [facility]  
11/27/2018  3:30 PM Gil Case [facility]  
11/29/2018  3:45 PM Missael Hawkins PTA [facility]

## 2018-11-21 ENCOUNTER — HOSPITAL ENCOUNTER (OUTPATIENT)
Dept: PHYSICAL THERAPY | Age: 63
End: 2018-11-21
Payer: MEDICAID

## 2018-11-27 ENCOUNTER — HOSPITAL ENCOUNTER (OUTPATIENT)
Dept: PHYSICAL THERAPY | Age: 63
Discharge: HOME | End: 2018-11-27
Payer: MEDICAID

## 2018-11-27 PROCEDURE — 97110 THERAPEUTIC EXERCISES: CPT

## 2018-11-27 NOTE — PROGRESS NOTES
PT DAILY TREATMENT NOTE  Patient Name: Thom Mckay Date:2018 : 1955 [x]  Patient  Verified Payor: [facility] MEDICAID / Plan: [address] [place] [address] / Product Type: Managed Care Medicaid / In time: 03:35  Out time:04:10 Total Treatment Time (min): 35 Visit #: 5 of 16 Treatment Area: Cervicalgia [M54.2] Sprain of joints and ligaments of unspecified parts of neck, initial encounter [S13. 9XXA] SUBJECTIVE Pain Level (0-10 scale): 5/10 Any medication changes, allergies to medications, adverse drug reactions, diagnosis change, or new procedure performed?: [x] No    [] Yes (see summary sheet for update) Subjective functional status/changes:   [] No changes reported Patient reports she feels she is doing better. OBJECTIVE Modality rationale: MH to L shoulder/neck x 10 min  decrease pain and increase tissue extensibility to improve the patients ability to relax and perform ROM mobility ex with less pain and tension  
[x] Skin assessment post-treatment:  [x]intact []redness- no adverse reaction 
  []redness  adverse reaction:  
 
25 min Therapeutic Exercise:  [] See flow sheet :  
Rationale: increase ROM and increase strength to improve the patients ability to increase shoulder ROM Other Objective/Functional Measures: FOTO: 53, change of 21 Pain Level (0-10 scale) post treatment: 5/10 ASSESSMENT/Changes in Function:  
Patient had poor tolerance with activities today. PT tried to encourage patient to perform more exercises however patient unable to continue requesting moist heat at that time. Patient will continue to benefit from skilled PT services to modify and progress therapeutic interventions, address functional mobility deficits, address ROM deficits, address strength deficits, analyze and address soft tissue restrictions and analyze and cue movement patterns to attain remaining goals. []  See Plan of Care []  See progress note/recertification 
[]  See Discharge Summary Progress towards goals / Updated goals: 
Short Term Goals: To be accomplished in 4 weeks: 1.  Pt will be compliant with HEP for max progression toward all goals and return to PLOF. Eval:started on HEP and given handout of ex , initial focus on gentle neck , UE and trunk ROM  
Current: NA 
  
2.Pt pain will improve >= 40% to allow pt improved sleep and tolerance to daily activity. Eval:pain 6-8/10  
Current: NA 
  
3. Pt FOTO score will increase by >=10 points to show improvement in functional mobility. Eval:32/100  
Current: 53, change of 21 progressing 
  
  
Long Term Goals: To be accomplished in 8 weeks: 1.  Pt will be independant with HEP for continued and ongoing progression following discharge toward full functional mobility. Eval:started on HEP  
Current: NA 
  
2.Pt pain will improve >= 80% to allow pt return to PLOF. Eval:pain 6-8/10  
Current: NA 
  
3. Pt FOTO score will increase by >=27  points to show improvement in functional mobility. Eval:32/100 Current:53/100, 21 progressing  
  
4. Pt strength will improve to 5/5  to allow pt to return to full functional activity and PLOF  
Eval: left shoulder flexion /Abd 2/5 limited motion,IR 4/5 , ER 3-/5 , 3/5 bicep , tricep and wrist 4+/5 pain limiting resist today , right shoulder 3-4/5 grossly  
Current: NA 
  
5. Pt AROM will improve to >=160 deg flexion and Abd  to allow pt to return to full functional reach activity, tolerated putting dishes away in cupboards .  
Eval: right shoulder 130 deg flexion and abd , left shoulder 110 deg flexion , 79 deg abd  
Current: NA 
  
6.  Pt will be able to have full functional cervical rotation ROM to allow ease with clearing traffic to rear while driving . 
Eval:cerv ROtation right 35 , left 19 deg  
Current: NA 
 
PLAN 
[]  Upgrade activities as tolerated     [x]  Continue plan of care 
[]  Update interventions per flow sheet []  Discharge due to:_ 
[]  Other:_ Shawanda Miller 11/27/2018  3:39 PM 
 
Future Appointments Date Time Provider iWlder Padilla 11/29/2018  3:45 PM Ranjana Vazquez PTA [facility]

## 2018-11-29 ENCOUNTER — HOSPITAL ENCOUNTER (OUTPATIENT)
Dept: PHYSICAL THERAPY | Age: 63
Discharge: HOME | End: 2018-11-29
Payer: MEDICAID

## 2018-11-29 PROCEDURE — 97110 THERAPEUTIC EXERCISES: CPT

## 2018-11-29 PROCEDURE — 97140 MANUAL THERAPY 1/> REGIONS: CPT

## 2018-11-29 NOTE — PROGRESS NOTES
PT DAILY TREATMENT NOTE - [facility]  Patient Name: Dat Irene Date:2018 : 1955 [x]  Patient  Verified Payor: [facility] MEDICAID / Plan: [address] [place] [address] / Product Type: Managed Care Medicaid / In time:347  Out time:430 Total Treatment Time (min): 43 Total Timed Codes (min): 33 
1:1 Treatment Time ( only):    
Visit #: 6 of 16 Treatment Area: Cervicalgia [M54.2] Sprain of joints and ligaments of unspecified parts of neck, initial encounter [S13. 9XXA] SUBJECTIVE Pain Level (0-10 scale): 4-5/10 Any medication changes, allergies to medications, adverse drug reactions, diagnosis change, or new procedure performed?: [x] No    [] Yes (see summary sheet for update) Subjective functional status/changes:   [] No changes reported Pt reports that she is continuing to have pain and has difficulty sleeping at night OBJECTIVE Modality rationale: decrease pain and increase tissue extensibility to improve the patients ability to complete ADls Type Additional Details  
[] Estim:  []Unatt       []IFC  []Premod []Other:  []w/ice   []w/heat Position: Location:  
[] Estim: []Att    []TENS instruct  []NMES []Other:  []w/US   []w/ice   []w/heat Position: Location:  
[]  Traction: [] Cervical       []Lumbar 
                     [] Prone          []Supine []Intermittent   []Continuous Lbs: 
[] before manual 
[] after manual  
[]  Ultrasound: []Continuous   [] Pulsed []1MHz   []3MHz W/cm2: 
Location:  
[]  Iontophoresis with dexamethasone Location: [] Take home patch  
[] In clinic  
[]  Ice     [x]  heat 
[]  Ice massage 
[]  Laser  
[]  Anodyne Position:supine Location:neck  
[]  Laser with stim 
[]  Other:  Position: Location:  
[]  Vasopneumatic Device Pressure:       [] lo [] med [] hi  
Temperature: [] lo [] med [] hi  
 [x] Skin assessment post-treatment:  [x]intact [x]redness- no adverse reaction 
  []redness  adverse reaction:  
 
 
8 min Therapeutic Exercise:  - See flow sheet :  
Rationale: increase ROM, increase strength, improve coordination, improve balance and increase proprioception to improve the patients ability to complete ADls 25 min Manual Therapy: SOR, MFR to scalenes and SCm, MFR to bialteral shoulders Rationale: decrease pain, increase ROM, increase tissue extensibility, decrease edema  and decrease trigger points to complete ADls With 
 [x] TE 
 [] TA 
 [] neuro 
 [] other: Patient Education: [x] Review HEP [] Progressed/Changed HEP based on:  
[x] positioning   [x] body mechanics   [] transfers   [x] heat/ice application   
[] other:   
 
Other Objective/Functional Measures: 
 
Pain Level (0-10 scale) post treatment: 4/10 ASSESSMENT/Changes in Function: Pt demonstrates restricted scalenes and SCm as well as pectoralis major contributing to ongoing pain. Pt and therapist noted improved mobility folllowing manual work. Pt was encouraged to complete HEP daily as she has been very inconcistent Patient will continue to benefit from skilled PT services to address functional mobility deficits, address ROM deficits, address strength deficits, analyze and address soft tissue restrictions, analyze and cue movement patterns, analyze and modify body mechanics/ergonomics and assess and modify postural abnormalities to attain remaining goals. []  See Plan of Care 
[]  See progress note/recertification 
[]  See Discharge Summary Progress towards goals / Updated goals: 1.  Pt will be compliant with HEP for max progression toward all goals and return to PLOF. Eval:started on HEP and given handout of ex , initial focus on gentle neck , UE and trunk ROM  
Current: compliant 
  2. Pt pain will improve >= 40% to allow pt improved sleep and tolerance to daily activity.  
Eval:pain 6-8/10  
 Current:Still difficult to sleep 
  
3. Pt FOTO score will increase by >=10 points to show improvement in functional mobility. Eval:32/100  
Current: 53, change of 21 progressing 
  
  
Long Term Goals: To be accomplished in 8 weeks: 1.  Pt will be independant with HEP for continued and ongoing progression following discharge toward full functional mobility. Eval:started on HEP  
Current: NA 
  
2.Pt pain will improve >= 80% to allow pt return to PLOF. Eval:pain 6-8/10  
Current: NA 
  
3. Pt FOTO score will increase by >=27  points to show improvement in functional mobility. Eval:32/100 Current:53/100, 21 progressing  
  
4. Pt strength will improve to 5/5  to allow pt to return to full functional activity and PLOF  
Eval: left shoulder flexion /Abd 2/5 limited motion,IR 4/5 , ER 3-/5 , 3/5 bicep , tricep and wrist 4+/5 pain limiting resist today , right shoulder 3-4/5 grossly  
Current: NA 
  
5. Pt AROM will improve to >=160 deg flexion and Abd  to allow pt to return to full functional reach activity, tolerated putting dishes away in cupboards .  
Eval: right shoulder 130 deg flexion and abd , left shoulder 110 deg flexion , 79 deg abd  
Current: NA 
  
6.  Pt will be able to have full functional cervical rotation ROM to allow ease with clearing traffic to rear while driving . 
Eval:cerv ROtation right 35 , left 19 deg  
Current: NA 
  
 
 
 
PLAN 
[]  Upgrade activities as tolerated     []  Continue plan of care 
[]  Update interventions per flow sheet      
[]  Discharge due to:_ 
[]  Other:_ Anne Caldera PTA 11/29/2018  4:24 PM 
 
Future Appointments Date Time Provider Wilder Padilla 12/4/2018  4:30 PM [facility]  
12/6/2018  3:30 PM Mark Anthony Ruiz, [address] [facility]

## 2018-12-04 ENCOUNTER — HOSPITAL ENCOUNTER (OUTPATIENT)
Dept: PHYSICAL THERAPY | Age: 63
Discharge: HOME | End: 2018-12-04
Payer: MEDICAID

## 2018-12-04 PROCEDURE — 97110 THERAPEUTIC EXERCISES: CPT

## 2018-12-04 NOTE — PROGRESS NOTES
PT DAILY TREATMENT NOTE  Patient Name: Lacho Lucio Date:2018 : 1955 [x]  Patient  Verified Payor: [facility] MEDICAID / Plan: [address] [place] [address] / Product Type: Managed Care Medicaid / In time:04:35  Out time:05:15 Total Treatment Time (min): 35 Visit #: 7 of 16 Treatment Area: Cervicalgia [M54.2] Sprain of joints and ligaments of unspecified parts of neck, initial encounter [S13. 9XXA] SUBJECTIVE Pain Level (0-10 scale): 4.5/10 Any medication changes, allergies to medications, adverse drug reactions, diagnosis change, or new procedure performed?: [x] No    [] Yes (see summary sheet for update) Subjective functional status/changes:   [x] No changes reported OBJECTIVE Modality rationale: decrease pain and increase tissue extensibility to improve the patients ability to increase tolerance with activities Type Additional Details  
[] Estim:  []Unatt       []IFC  []Premod []Other:  []w/ice   []w/heat Position: Location:  
[] Estim: []Att    []TENS instruct  []NMES []Other:  []w/US   []w/ice   []w/heat Position: Location:  
[]  Traction: [] Cervical       []Lumbar 
                     [] Prone          []Supine []Intermittent   []Continuous Lbs: 
[] before manual 
[] after manual  
[]  Ultrasound: []Continuous   [] Pulsed []1MHz   []3MHz W/cm2: 
Location:  
[]  Iontophoresis with dexamethasone Location: [] Take home patch  
[] In clinic  
[]  Ice     [x]  Heat cervical shoulder  
[]  Ice massage 
[]  Laser  
[]  Anodyne Position: supine Location:10 minutes   
[]  Laser with stim 
[]  Other:  Position: Location:  
[]  Vasopneumatic Device Pressure:       [] lo [] med [] hi  
Temperature: [] lo [] med [] hi  
[] Skin assessment post-treatment:  []intact []redness- no adverse reaction 
  []redness  adverse reaction: 10 min Therapeutic Exercise:  [] See flow sheet :  
Rationale: increase ROM and increase strength to improve the patients ability to increase shoulder stabilization Other Objective/Functional Measures:   
 
Pain Level (0-10 scale) post treatment: 3/10 ASSESSMENT/Changes in Function:  
Patient had increased tolerance with activities however still limited with pain following UT stretch. PT addressed pain with moist heat. Patient will continue to benefit from skilled PT services to modify and progress therapeutic interventions, address functional mobility deficits, address ROM deficits, address strength deficits, analyze and address soft tissue restrictions, analyze and cue movement patterns and analyze and modify body mechanics/ergonomics to attain remaining goals. []  See Plan of Care 
[]  See progress note/recertification 
[]  See Discharge Summary Progress towards goals / Updated goals: 
Progress towards goals / Updated goals: 1.  Pt will be compliant with HEP for max progression toward all goals and return to PLOF. Eval:started on HEP and given handout of ex , initial focus on gentle neck , UE and trunk ROM  
Current: compliant 
  2. Pt pain will improve >= 40% to allow pt improved sleep and tolerance to daily activity. Eval:pain 6-8/10  
Current:Still difficult to sleep 
  
3. Pt FOTO score will increase by >=10 points to show improvement in functional mobility. Eval:32/100  
Current: 53, change of 21 progressing 
  
  
Long Term Goals: To be accomplished in 8 weeks: 1.  Pt will be independant with HEP for continued and ongoing progression following discharge toward full functional mobility. Eval:started on HEP  
Current: NA 
  
2.Pt pain will improve >= 80% to allow pt return to PLOF. Eval:pain 6-8/10  
Current: NA 
  
3. Pt FOTO score will increase by >=27  points to show improvement in functional mobility. Eval:32/100 Current:53/100, 21 progressing  
  
 4. Pt strength will improve to 5/5  to allow pt to return to full functional activity and PLOF  
Eval: left shoulder flexion /Abd 2/5 limited motion,IR 4/5 , ER 3-/5 , 3/5 bicep , tricep and wrist 4+/5 pain limiting resist today , right shoulder 3-4/5 grossly  
Current: NA 
  
5. Pt AROM will improve to >=160 deg flexion and Abd  to allow pt to return to full functional reach activity, tolerated putting dishes away in cupboards .  
Eval: right shoulder 130 deg flexion and abd , left shoulder 110 deg flexion , 79 deg abd  
Current: NA 
  
6.  Pt will be able to have full functional cervical rotation ROM to allow ease with clearing traffic to rear while driving . 
Eval:cerv ROtation right 35 , left 19 deg  
Current: NA 
 
PLAN 
[]  Upgrade activities as tolerated     [x]  Continue plan of care 
[]  Update interventions per flow sheet      
[]  Discharge due to:_ 
[]  Other:_ Oma Trejo 12/4/2018  4:55 PM 
 
Future Appointments Date Time Provider Wilder Padilla 12/6/2018  3:30 PM Kathy Henry, PT [facility]

## 2018-12-06 ENCOUNTER — HOSPITAL ENCOUNTER (OUTPATIENT)
Dept: PHYSICAL THERAPY | Age: 63
Discharge: HOME | End: 2018-12-06
Payer: MEDICAID

## 2018-12-06 PROCEDURE — 97110 THERAPEUTIC EXERCISES: CPT

## 2018-12-06 PROCEDURE — 97140 MANUAL THERAPY 1/> REGIONS: CPT
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
## 2018-12-11 ENCOUNTER — HOSPITAL ENCOUNTER (OUTPATIENT)
Dept: PHYSICAL THERAPY | Age: 63
Discharge: HOME | End: 2018-12-11
Payer: MEDICAID

## 2018-12-11 PROCEDURE — 97110 THERAPEUTIC EXERCISES: CPT

## 2018-12-11 PROCEDURE — 97140 MANUAL THERAPY 1/> REGIONS: CPT

## 2018-12-11 NOTE — PROGRESS NOTES
[facility]  [address], [address] [facility] [address]  Ph [phone]  Fx [phone]    Physical Therapy Progress Note  Patient name: Migdalia Lundy Start of Care: 2018   Referral source: Escobar Mooney MD : 1955   Medical/Treatment Diagnosis: Cervicalgia [M54.2]  Sprain of joints and ligaments of unspecified parts of neck, initial encounter [S13. 9XXA] Onset Date:23 Oct 2018     Prior Hospitalization: see medical history Provider#: [id number]   Medications: Verified on Patient Summary List      Comorbidities: high blood pressure,  depression   Prior Level of Function: no limitations       Visits from Start of Care: 9    Missed Visits: 2    Established Goals:         Excellent           Good         Limited           None  [] Increased ROM   []  [x]  []  []  [] Increased Strength   []  []  [x]  []  [] Increased Mobility   []  [x]  []  []   [] Decreased Pain   []  []  [x]  []  [] Decreased Swelling  []  []  []  []    Key Functional Changes: improvement in cervical AROM   Updated Goals: to be achieved in 2 weeks:   Continue with unmet goals     ASSESSMENT/RECOMMENDATIONS:Patient making steady progress however reports continued pain. Patient reports greatest relief for pain included manual work. PT recommends continuation of therapy services for last couple of visits to reduce pain. Patient will continue to benefit from skilled PT services to modify and progress therapeutic interventions, address functional mobility deficits, address ROM deficits, address strength deficits, analyze and address soft tissue restrictions, analyze and cue movement patterns, analyze and modify body mechanics/ergonomics and assess and modify postural abnormalities to attain remaining goals. 1.  Pt will be compliant with HEP for max progression toward all goals and return to PLOF.   Eval:started on HEP and given handout of ex , initial focus on gentle neck , UE and trunk ROM   Current: compliant-MET     2.Pt pain will improve >= 40% to allow pt improved sleep and tolerance to daily activity. Eval:pain 6-8/10   Current:70% improvement      3. Pt FOTO score will increase by >=10 points to show improvement in functional mobility. Eval:32/100   Current: 53, change of 21 progressing-MET        Long Term Goals: To be accomplished in 8 weeks:  1.  Pt will be independant with HEP for continued and ongoing progression following discharge toward full functional mobility. Eval:started on HEP   Current: compliant-progressing      2. Pt pain will improve >= 80% to allow pt return to PLOF. Eval:pain 6-8/10   Current: 70%-nearly met      3. Pt FOTO score will increase by >=27  points to show improvement in functional mobility. Eval:32/100  Current:53/100, 21 progressing      4. Pt strength will improve to 5/5  to allow pt to return to full functional activity and PLOF   Eval: left shoulder flexion /Abd 2/5 limited motion,IR 4/5 , ER 3-/5 , 3/5 bicep , tricep and wrist 4+/5 pain limiting resist today , right shoulder 3-4/5 grossly   Current: 3+/5 right shoulder flexion/abduction, 3/5 left shoulder flexion 3+/5 abduction      5.  Pt AROM will improve to >=160 deg flexion and Abd  to allow pt to return to full functional reach activity, tolerated putting dishes away in cupboards .   Eval: right shoulder 130 deg flexion and abd , left shoulder 110 deg flexion , 79 deg abd   Current: right flexion 130, left 115 flexion, right abduction 130, left abduction 110-minimal change     6.  Pt will be able to have full functional cervical rotation ROM to allow ease with clearing traffic to rear while driving .  Eval:cerv ROtation right 35 , left 19 deg   Current: right 50, left 48 degrees-progressing     [x]Continue therapy per initial plan/protocol at a frequency of  2 x per week for 2 weeks  []Continue therapy with the following recommended changes:_____________________ _____________________________________________________________________  []Discontinue therapy progressing towards or have reached established goals  []Discontinue therapy due to lack of appreciable progress towards goals  []Discontinue therapy due to lack of attendance or compliance  []Await Physician's recommendations/decisions regarding therapy  []Other:________________________________________________________________    Thank you for this referral.   Rocky Beto 12/11/2018 5:01 PM    NOTE TO PHYSICIAN:  [address] AND   FAX TO [facility]: [phone]  If you are unable to process this request in 24 hours please contact our office: [phone]      ____ I have read the above report and request that my patient continue therapy with the following changes/special instructions:  ____ I have read the above report and request that my patient be discharged from therapy    Physician's Signature:____________Date:_________TIME:________    ** Signature, Date and Time must be completed for valid certification **

## 2018-12-11 NOTE — PROGRESS NOTES
PT DAILY TREATMENT NOTE     Patient Name: Douglas Muro  Date:2018  : 1955  [x]  Patient  Verified  Payor: [facility] MEDICAID / Plan: [address] [place] [address] / Product Type: Managed Care Medicaid /    In time:04:01  Out time:04:57  Total Treatment Time (min): 56  Visit #: 9 of 16    Treatment Area: Cervicalgia [M54.2]  Sprain of joints and ligaments of unspecified parts of neck, initial encounter [S13. 9XXA]    SUBJECTIVE  Pain Level (0-10 scale): 5/10  Any medication changes, allergies to medications, adverse drug reactions, diagnosis change, or new procedure performed?: [x] No    [] Yes (see summary sheet for update)  Subjective functional status/changes:   [] No changes reported      OBJECTIVE    Modality rationale: decrease pain and increase tissue extensibility to improve the patients ability to increase    Type Additional Details   [] Estim:  []Unatt       []IFC  []Premod                        []Other:  []w/ice   []w/heat  Position:  Location:   [] Estim: []Att    []TENS instruct  []NMES                    []Other:  []w/US   []w/ice   []w/heat  Position:  Location:   []  Traction: [] Cervical       []Lumbar                       [] Prone          []Supine                       []Intermittent   []Continuous Lbs:  [] before manual  [] after manual   []  Ultrasound: []Continuous   [] Pulsed                           []1MHz   []3MHz W/cm2:  Location:   []  Iontophoresis with dexamethasone         Location: [] Take home patch   [] In clinic   []  Ice     [x]  heat  []  Ice massage  []  Laser   []  Anodyne Position: supine   Location: cervical    []  Laser with stim  []  Other:  Position:  Location:   []  Vasopneumatic Device Pressure:       [] lo [] med [] hi   Temperature: [] lo [] med [] hi   [] Skin assessment post-treatment:  []intact []redness- no adverse reaction    []redness  adverse reaction:     36 min Therapeutic Exercise:  [] See flow sheet :   Rationale: increase ROM and increase strength to improve the patients ability to increase cervical stabilization     10 min Manual Therapy:  STM/trigger point release cervical stabilizers with suboccipital release, SCM, UT, anterior scalenes    Rationale: decrease pain, increase ROM, increase tissue extensibility and decrease trigger points to increase cervical motility     Other Objective/Functional Measures:      Pain Level (0-10 scale) post treatment: 3/10    ASSESSMENT/Changes in Function:   Patient making steady progress however reports continued pain. Patient reports greatest relief for pain included manual work. PT recommends continuation of therapy services for last couple of visits to reduce pain. Patient will continue to benefit from skilled PT services to modify and progress therapeutic interventions, address functional mobility deficits, address ROM deficits, address strength deficits, analyze and address soft tissue restrictions, analyze and cue movement patterns, analyze and modify body mechanics/ergonomics and assess and modify postural abnormalities to attain remaining goals. []  See Plan of Care  []  See progress note/recertification  []  See Discharge Summary         Progress towards goals / Updated goals:  1.  Pt will be compliant with HEP for max progression toward all goals and return to PLOF. Eval:started on HEP and given handout of ex , initial focus on gentle neck , UE and trunk ROM   Current: compliant-MET     2.Pt pain will improve >= 40% to allow pt improved sleep and tolerance to daily activity. Eval:pain 6-8/10   Current:70% improvement      3. Pt FOTO score will increase by >=10 points to show improvement in functional mobility. Eval:32/100   Current: 53, change of 21 progressing-MET        Long Term Goals: To be accomplished in 8 weeks:  1.  Pt will be independant with HEP for continued and ongoing progression following discharge toward full functional mobility.    Eval:started on HEP   Current: compliant-progressing      2. Pt pain will improve >= 80% to allow pt return to PLOF. Eval:pain 6-8/10   Current: 70%-nearly met      3. Pt FOTO score will increase by >=27  points to show improvement in functional mobility. Eval:32/100  Current:53/100, 21 progressing      4. Pt strength will improve to 5/5  to allow pt to return to full functional activity and PLOF   Eval: left shoulder flexion /Abd 2/5 limited motion,IR 4/5 , ER 3-/5 , 3/5 bicep , tricep and wrist 4+/5 pain limiting resist today , right shoulder 3-4/5 grossly   Current:  3+/5 right shoulder flexion/abduction, 3/5 left shoulder flexion 3+/5 abduction      5.  Pt AROM will improve to >=160 deg flexion and Abd  to allow pt to return to full functional reach activity, tolerated putting dishes away in cupboards .   Eval: right shoulder 130 deg flexion and abd , left shoulder 110 deg flexion , 79 deg abd   Current: right flexion 130, left 115 flexion, right abduction 130, left abduction 110-minimal change     6.  Pt will be able to have full functional cervical rotation ROM to allow ease with clearing traffic to rear while driving .  Eval:cerv ROtation right 35 , left 19 deg   Current: right 50, left 48 degrees-progressing     PLAN  []  Upgrade activities as tolerated     [x]  Continue plan of care  []  Update interventions per flow sheet       []  Discharge due to:_  []  Other:_      Anjelica Reyes 12/11/2018  4:07 PM    Future Appointments   Date Time Provider Wilder Padilla   12/13/2018  3:45 PM William Quintero PTA [facility]

## 2018-12-13 ENCOUNTER — HOSPITAL ENCOUNTER (OUTPATIENT)
Dept: PHYSICAL THERAPY | Age: 63
Discharge: HOME | End: 2018-12-13
Payer: MEDICAID

## 2018-12-13 PROCEDURE — 97140 MANUAL THERAPY 1/> REGIONS: CPT

## 2018-12-13 PROCEDURE — 97110 THERAPEUTIC EXERCISES: CPT

## 2018-12-13 NOTE — PROGRESS NOTES
PT DAILY TREATMENT NOTE     Patient Name: Cornelius Bardales  Date:2018  : 1955  [x]  Patient  Verified  Payor: [facility] MEDICAID / Plan: [address] [place] [address] / Product Type: Managed Care Medicaid /    In time:348  Out time:436  Total Treatment Time (min): 48  Visit #: 10 of 13    Treatment Area: Cervicalgia [M54.2]  Sprain of joints and ligaments of unspecified parts of neck, initial encounter [S13. 9XXA]    SUBJECTIVE  Pain Level (0-10 scale): 10  Any medication changes, allergies to medications, adverse drug reactions, diagnosis change, or new procedure performed?: [x] No    [] Yes (see summary sheet for update)  Subjective functional status/changes:   [] No changes reported  Pt reports that she is getting better    OBJECTIVE    Modality rationale: decrease pain and increase tissue extensibility to improve the patients ability to complete ADLs   Min Type Additional Details    [] Estim:  []Unatt       []IFC  []Premod                        []Other:  []w/ice   []w/heat  Position:  Location:    [] Estim: []Att    []TENS instruct  []NMES                    []Other:  []w/US   []w/ice   []w/heat  Position:  Location:    []  Traction: [] Cervical       []Lumbar                       [] Prone          []Supine                       []Intermittent   []Continuous Lbs:  [] before manual  [] after manual    []  Ultrasound: []Continuous   [] Pulsed                           []1MHz   []3MHz W/cm2:  Location:    []  Iontophoresis with dexamethasone         Location: [] Take home patch   [] In clinic    []  Ice     [x]  heat  []  Ice massage  []  Laser   []  Anodyne Position:supineLocation:neck and left shoulder    []  Laser with stim  []  Other:  Position:  Location:    []  Vasopneumatic Device Pressure:       [] lo [] med [] hi   Temperature: [] lo [] med [] hi   [] Skin assessment post-treatment:  [x]intact [x]redness- no adverse reaction    []redness  adverse reaction:       23 min Therapeutic Exercise:  [] See flow sheet :   Rationale: increase ROM, increase strength, improve coordination and increase proprioception to improve the patients ability to complete ADLs      15 min Manual Therapy: myofasciAL ARM PULL ON LEFT, STM to clavicular insertion of levator scapulae, PROM,    Rationale: decrease pain, increase ROM, increase tissue extensibility, decrease edema  and decrease trigger points to complete ADls          With   [x] TE   [] TA   [] neuro   [] other: Patient Education: [x] Review HEP    [] Progressed/Changed HEP based on:   [x] positioning   [x] body mechanics   [] transfers   [x] heat/ice application    [] other:      Other Objective/Functional Measures:      Pain Level (0-10 scale) post treatment: 3/10    ASSESSMENT/Changes in Function: Pt demonstrates continued myofascial restrictions in left shoulder contirbuitng to ongoing pain. She continues to be limited with AROm for the shoulder but overall feels she is getting better    Patient will continue to benefit from skilled PT services to address functional mobility deficits, address ROM deficits, address strength deficits, analyze and address soft tissue restrictions, analyze and cue movement patterns, analyze and modify body mechanics/ergonomics and assess and modify postural abnormalities to attain remaining goals. []  See Plan of Care  []  See progress note/recertification  []  See Discharge Summary         Progress towards goals / Updated goals:  Long Term Goals: To be accomplished in 8 weeks:  1.  Pt will be independant with HEP for continued and ongoing progression following discharge toward full functional mobility. Eval:started on HEP   Current: compliant-progressing      2. Pt pain will improve >= 80% to allow pt return to PLOF. Eval:pain 6-8/10   Current: 70%-nearly met      3. Pt FOTO score will increase by >=27  points to show improvement in functional mobility.    Eval:32/100  Current 45 regressed since last FOTO 12-13-18     4. Pt strength will improve to 5/5  to allow pt to return to full functional activity and PLOF   Eval: left shoulder flexion /Abd 2/5 limited motion,IR 4/5 , ER 3-/5 , 3/5 bicep , tricep and wrist 4+/5 pain limiting resist today , right shoulder 3-4/5 grossly   Current: 3+/5 right shoulder flexion/abduction, 3/5 left shoulder flexion 3+/5 abduction      5.  Pt AROM will improve to >=160 deg flexion and Abd  to allow pt to return to full functional reach activity, tolerated putting dishes away in cupboards .   Eval: right shoulder 130 deg flexion and abd , left shoulder 110 deg flexion , 79 deg abd   Current: right flexion 130, left 115 flexion, right abduction 130, left abduction 110-minimal change     6.  Pt will be able to have full functional cervical rotation ROM to allow ease with clearing traffic to rear while driving .  Eval:cerv ROtation right 35 , left 19 deg   Current: right 50, left 48 degrees-progressing         PLAN  []  Upgrade activities as tolerated     []  Continue plan of care  []  Update interventions per flow sheet       []  Discharge due to:_  []  Other:_      Los Newby PTA 12/13/2018  3:55 PM    Future Appointments   Date Time Provider Wilder Padilla   12/18/2018  4:15 PM Esperanza Isbell [facility]   12/31/2018  3:00 PM Anant Bull PTA [facility]   1/2/2019  1:00 PM Anant Bull PTA [facility]

## 2018-12-18 ENCOUNTER — HOSPITAL ENCOUNTER (OUTPATIENT)
Dept: PHYSICAL THERAPY | Age: 63
Discharge: HOME | End: 2018-12-18
Payer: MEDICAID

## 2018-12-18 PROCEDURE — 97140 MANUAL THERAPY 1/> REGIONS: CPT

## 2018-12-18 PROCEDURE — 97110 THERAPEUTIC EXERCISES: CPT

## 2018-12-18 NOTE — PROGRESS NOTES
PT DAILY TREATMENT NOTE     Patient Name: Nitesh Calderon  Date:2018  : 1955  [x]  Patient  Verified  Payor: [facility] MEDICAID / Plan: [facility] [address] / Product Type: Managed Care Medicaid /    In time:410  Out time:455  Total Treatment Time (min): 45  Visit #: 11 of 16    Treatment Area: Cervicalgia [M54.2]  Sprain of joints and ligaments of unspecified parts of neck, initial encounter [S13. 9XXA]    SUBJECTIVE  Pain Level (0-10 scale): 3/10  Any medication changes, allergies to medications, adverse drug reactions, diagnosis change, or new procedure performed?: [x] No    [] Yes (see summary sheet for update)  Subjective functional status/changes:   [] No changes reported  Pt states that she is improving.     OBJECTIVE    Modality rationale: decrease pain and increase tissue extensibility to improve the patients ability to complete ADls   Type Additional Details   [] Estim:  []Unatt       []IFC  []Premod                        []Other:  []w/ice   []w/heat  Position:  Location:   [] Estim: []Att    []TENS instruct  []NMES                    []Other:  []w/US   []w/ice   []w/heat  Position:  Location:   []  Traction: [] Cervical       []Lumbar                       [] Prone          []Supine                       []Intermittent   []Continuous Lbs:  [] before manual  [] after manual   []  Ultrasound: []Continuous   [] Pulsed                           []1MHz   []3MHz W/cm2:  Location:   []  Iontophoresis with dexamethasone         Location: [] Take home patch   [] In clinic   []  Ice     [x]  heat  []  Ice massage  []  Laser   []  Anodyne Position:supineLocation:neck   []  Laser with stim  []  Other:  Position:  Location:   []  Vasopneumatic Device Pressure:       [] lo [] med [] hi   Temperature: [] lo [] med [] hi   [x] Skin assessment post-treatment:  [x]intact [x]redness- no adverse reaction        25 min Therapeutic Exercise:  [] See flow sheet :   Rationale: increase ROM, increase strength, improve coordination and increase proprioception to improve the patients ability to complete ADls      10 min Manual Therapy:  SOR, STM to scalenes and SCm, upper trapezius   Rationale: decrease pain, increase ROM, increase tissue extensibility, decrease edema  and decrease trigger points to complete ADls          With   [x] TE   [] TA   [] neuro   [] other: Patient Education: [x] Review HEP    [] Progressed/Changed HEP based on:   [x] positioning   [] body mechanics   [] transfers   [x] heat/ice application    [] other:      Other Objective/Functional Measures:      Pain Level (0-10 scale) post treatment: 1/10    ASSESSMENT/Changes in Function:Pt demonstrates improving strength however progressing slowly. Patient will continue to benefit from skilled PT services to address functional mobility deficits, address ROM deficits, address strength deficits, analyze and address soft tissue restrictions, analyze and cue movement patterns and analyze and modify body mechanics/ergonomics to attain remaining goals. []  See Plan of Care  []  See progress note/recertification  []  See Discharge Summary         Progress towards goals / Updated goals:  Long Term Goals: To be accomplished in 8 weeks:  1.  Pt will be independant with HEP for continued and ongoing progression following discharge toward full functional mobility. Eval:started on HEP   Current: compliant-progressing      2. Pt pain will improve >= 80% to allow pt return to PLOF. Eval:pain 6-8/10   Current: 70%-nearly met      3. Pt FOTO score will increase by >=27  points to show improvement in functional mobility. Eval:32/100  Current 45 regressed since last FOTO 12-13-18     4.  Pt strength will improve to 5/5  to allow pt to return to full functional activity and PLOF   Eval: left shoulder flexion /Abd 2/5 limited motion,IR 4/5 , ER 3-/5 , 3/5 bicep , tricep and wrist 4+/5 pain limiting resist today , right shoulder 3-4/5 grossly   Current: 3+/5 right shoulder flexion/abduction, 3/5 left shoulder flexion 3+/5 abduction      5.  Pt AROM will improve to >=160 deg flexion and Abd  to allow pt to return to full functional reach activity, tolerated putting dishes away in cupboards .   Eval: right shoulder 130 deg flexion and abd , left shoulder 110 deg flexion , 79 deg abd   Current: right flexion 130, left 115 flexion, right abduction 130, left abduction 110-minimal change     6.  Pt will be able to have full functional cervical rotation ROM to allow ease with clearing traffic to rear while driving .  Eval:cerv ROtation right 35 , left 19 deg   Current: right 50, left 48 degrees-progressing               PLAN  [x]  Upgrade activities as tolerated     [x]  Continue plan of care  []  Update interventions per flow sheet       []  Discharge due to:_  []  Other:_      Debra Ding PTA 12/18/2018  3:55 PM    Future Appointments   Date Time Provider Wilder Padilla   12/18/2018  4:15 PM Ruthie Anthony [facility]   12/19/2018  1:15 PM Victor M Patricia PTA [facility]   12/31/2018  3:00 PM Victor M Patricia PTA [facility]   1/2/2019  1:00 PM Victor M Patricia PTA [facility]

## 2018-12-19 ENCOUNTER — HOSPITAL ENCOUNTER (OUTPATIENT)
Dept: PHYSICAL THERAPY | Age: 63
Discharge: HOME | End: 2018-12-19
Payer: MEDICAID

## 2018-12-19 PROCEDURE — 97110 THERAPEUTIC EXERCISES: CPT

## 2018-12-19 PROCEDURE — 97140 MANUAL THERAPY 1/> REGIONS: CPT
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
## 2018-12-31 ENCOUNTER — APPOINTMENT (OUTPATIENT)
Dept: PHYSICAL THERAPY | Age: 63
End: 2018-12-31
Payer: MEDICAID

## 2019-01-02 ENCOUNTER — HOSPITAL ENCOUNTER (OUTPATIENT)
Dept: PHYSICAL THERAPY | Age: 64
Discharge: HOME | End: 2019-01-02
Payer: MEDICAID

## 2019-01-02 PROCEDURE — 97140 MANUAL THERAPY 1/> REGIONS: CPT

## 2019-01-02 PROCEDURE — 97110 THERAPEUTIC EXERCISES: CPT

## 2019-01-02 NOTE — PROGRESS NOTES
PT DAILY TREATMENT NOTE - [facility]  Patient Name: Jovany Ellis Date:2019 : 1955 [x]  Patient  Verified Payor: [facility] MEDICAID / Plan: [address] [place] [address] / Product Type: Managed Care Medicaid / In time:105  Out time:145 Total Treatment Time (min): 40 Total Timed Codes (min): 40 
1:1 Treatment Time ( only): 30 Visit #: 94 HO 11 Treatment Area: Cervicalgia [M54.2] Sprain of joints and ligaments of unspecified parts of neck, initial encounter [S13. 9XXA] SUBJECTIVE Pain Level (0-10 scale): 4/10 Any medication changes, allergies to medications, adverse drug reactions, diagnosis change, or new procedure performed?: [x] No    [] Yes (see summary sheet for update) Subjective functional status/changes:   [] No changes reported Pt reports that she is about 80% better since she started. She notes that she got a neck massager for [employer] and she thinks that really helped. OBJECTIVE 20 min Therapeutic Exercise:  [] See flow sheet :  
Rationale: increase ROM, increase strength, improve coordination, improve balance and increase proprioception to improve the patients ability to complete ADls 10 min Manual Therapy: myofascial arm pull, left PROM Rationale: decrease pain, increase ROM, increase tissue extensibility, decrease edema , correct positional vertigo and decrease trigger points to complete ADls With 
 [] TE 
 [] TA 
 [] neuro 
 [] other: Patient Education: [x] Review HEP [] Progressed/Changed HEP based on:  
[] positioning   [] body mechanics   [] transfers   [] heat/ice application   
[] other:   
 
Other Objective/Functional Measures:    
 
Pain Level (0-10 scale) post treatment: 0/10 ASSESSMENT/Changes in Function:Pt has met all goals and is ready for discharge. She is not limited with any function and reports compliance with HEP Patient will continue to benefit from skilled PT services to address functional mobility deficits, address ROM deficits, address strength deficits, analyze and address soft tissue restrictions, analyze and cue movement patterns, analyze and modify body mechanics/ergonomics, assess and modify postural abnormalities and address imbalance/dizziness to attain remaining goals. []  See Plan of Care 
[]  See progress note/recertification 
[]  See Discharge Summary Progress towards goals / Updated goals: 
Long Term Goals: To be accomplished in 8 weeks: 1.  Pt will be independant with HEP for continued and ongoing progression following discharge toward full functional mobility. Eval:started on HEP  
Current: compliant-progressing MET  
2.Pt pain will improve >= 80% to allow pt return to PLOF. Eval:pain 6-8/10  
Current: 80% MET  
  
3. Pt FOTO score will increase by >=27  points to show improvement in functional mobility. Eval:32/100 Current 71 MET 
  
4. Pt strength will improve to 5/5  to allow pt to return to full functional activity and PLOF  
Eval: left shoulder flexion /Abd 2/5 limited motion,IR 4/5 , ER 3-/5 , 3/5 bicep , tricep and wrist 4+/5 pain limiting resist today , right shoulder 3-4/5 grossly  
Current: 3+/5 right shoulder flexion/abduction, 3/5 left shoulder flexion 3+/5 abduction IMPROVED PARTIALLY MET 
  
5.  Pt AROM will improve to >=160 deg flexion and Abd  to allow pt to return to full functional reach activity, tolerated putting dishes away in cupboards .  
Eval: right shoulder 130 deg flexion and abd , left shoulder 110 deg flexion , 79 deg abd  
Current: 180 degrees MET 
  
6.  Pt will be able to have full functional cervical rotation ROM to allow ease with clearing traffic to rear while driving . 
Eval:cerv ROtation right 35 , left 19 deg  
Current: WFL MET  
 
 
 
PLAN 
[]  Upgrade activities as tolerated     []  Continue plan of care 
[]  Update interventions per flow sheet      
[]  Discharge due to:_ 
[]  Other:_   
 
 Fei Galeana PTA 1/2/2019  12:57 PM 
 
Future Appointments Date Time Provider Wilder Padilla 1/2/2019  1:00 PM Ann Queen PTA [facility]

## 2019-01-03 NOTE — PROGRESS NOTES
[facility] 
[address], [address] [facility] Ph [phone]  Fx [phone] Physical Therapy Discharge Summary Patient name: Adrien Mendez Start of Care: 2018 Referral source: Addis Reyes MD : 1955 Medical/Treatment Diagnosis: Cervicalgia [M54.2] Sprain of joints and ligaments of unspecified parts of neck, initial encounter [S13. 9XXA] Onset Date:2018 Prior Hospitalization: see medical history Provider#: [id number] Medications: Verified on Patient Summary List   
Comorbidities: high blood pressure,  depression 
 Prior Level of Function: no limitations  
  
Visits from Start of Care: 12    Missed Visits: 3 Reporting Period : 2018 to 1/3/2019 Summary of Care: 
 
Long Term Goals: To be accomplished in 8 weeks: 1.  Pt will be independant with HEP for continued and ongoing progression following discharge toward full functional mobility. Eval:started on HEP  
Current: compliant-progressing MET  
2.Pt pain will improve >= 80% to allow pt return to PLOF. Eval:pain 6-8/10  
Current: 80% MET  
  
3. Pt FOTO score will increase by >=27  points to show improvement in functional mobility. Eval:32/100 Current 71 MET 
  
4. Pt strength will improve to 5/5  to allow pt to return to full functional activity and PLOF  
Eval: left shoulder flexion /Abd 2/5 limited motion,IR 4/5 , ER 3-/5 , 3/5 bicep , tricep and wrist 4+/5 pain limiting resist today , right shoulder 3-4/5 grossly  
Current: 3+/5 right shoulder flexion/abduction, 3/5 left shoulder flexion 3+/5 abduction IMPROVED PARTIALLY MET 
  
5.  Pt AROM will improve to >=160 deg flexion and Abd  to allow pt to return to full functional reach activity, tolerated putting dishes away in cupboards .  
Eval: right shoulder 130 deg flexion and abd , left shoulder 110 deg flexion , 79 deg abd  
Current: 180 degrees MET 
  
 6.  Pt will be able to have full functional cervical rotation ROM to allow ease with clearing traffic to rear while driving . 
Eval:cerv ROtation right 35 , left 19 deg  
Current: WFL MET  
 
ASSESSMENT/RECOMMENDATIONS: Patient is ready for discharge at this time. Patient has met goals set and is compliant/independent with HEP [x]Discontinue therapy: [x]Patient has reached or is progressing toward set goals []Patient is non-compliant or has abdicated 
    []Due to lack of appreciable progress towards set goals Freddy Villalta 1/3/2019 1:35 PM